# Patient Record
Sex: MALE | Race: WHITE | NOT HISPANIC OR LATINO | Employment: FULL TIME | URBAN - NONMETROPOLITAN AREA
[De-identification: names, ages, dates, MRNs, and addresses within clinical notes are randomized per-mention and may not be internally consistent; named-entity substitution may affect disease eponyms.]

---

## 2019-06-11 ENCOUNTER — OFFICE VISIT (OUTPATIENT)
Dept: FAMILY MEDICINE CLINIC | Facility: CLINIC | Age: 39
End: 2019-06-11
Payer: COMMERCIAL

## 2019-06-11 VITALS
SYSTOLIC BLOOD PRESSURE: 128 MMHG | WEIGHT: 180.3 LBS | DIASTOLIC BLOOD PRESSURE: 82 MMHG | HEIGHT: 70 IN | BODY MASS INDEX: 25.81 KG/M2 | HEART RATE: 64 BPM

## 2019-06-11 DIAGNOSIS — F41.1 GENERALIZED ANXIETY DISORDER: Primary | Chronic | ICD-10-CM

## 2019-06-11 PROCEDURE — 99213 OFFICE O/P EST LOW 20 MIN: CPT | Performed by: FAMILY MEDICINE

## 2019-06-12 PROBLEM — F41.1 GENERALIZED ANXIETY DISORDER: Chronic | Status: ACTIVE | Noted: 2019-06-12

## 2019-06-28 ENCOUNTER — TELEPHONE (OUTPATIENT)
Dept: FAMILY MEDICINE CLINIC | Facility: CLINIC | Age: 39
End: 2019-06-28
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
## 2019-06-28 NOTE — TELEPHONE ENCOUNTER
Pt was bit by a tick about 2 weeks ago and would like to know if you could do the titer blood test on him

## 2019-07-03 ENCOUNTER — APPOINTMENT (OUTPATIENT)
Dept: LAB | Facility: HOSPITAL | Age: 39
End: 2019-07-03
Payer: COMMERCIAL
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
## 2019-07-03 PROCEDURE — 36415 COLL VENOUS BLD VENIPUNCTURE: CPT

## 2019-07-03 PROCEDURE — 86618 LYME DISEASE ANTIBODY: CPT

## 2019-07-03 PROCEDURE — 86617 LYME DISEASE ANTIBODY: CPT

## 2019-07-05 ENCOUNTER — OFFICE VISIT (OUTPATIENT)
Dept: FAMILY MEDICINE CLINIC | Facility: CLINIC | Age: 39
End: 2019-07-05
Payer: COMMERCIAL

## 2019-07-05 VITALS
WEIGHT: 175.7 LBS | BODY MASS INDEX: 25.15 KG/M2 | DIASTOLIC BLOOD PRESSURE: 82 MMHG | HEIGHT: 70 IN | SYSTOLIC BLOOD PRESSURE: 122 MMHG

## 2019-07-05 DIAGNOSIS — A69.20 ERYTHEMA CHRONICUM MIGRANS: Primary | ICD-10-CM

## 2019-07-05 LAB
B BURGDOR IGG SER IA-ACNC: 0.16
B BURGDOR IGM SER IA-ACNC: 5.96

## 2019-07-05 PROCEDURE — 99212 OFFICE O/P EST SF 10 MIN: CPT | Performed by: FAMILY MEDICINE

## 2019-07-05 RX ORDER — DOXYCYCLINE HYCLATE 100 MG/1
100 CAPSULE ORAL EVERY 12 HOURS SCHEDULED
Qty: 42 CAPSULE | Refills: 0 | Status: SHIPPED | OUTPATIENT
Start: 2019-07-05 | End: 2019-07-26

## 2019-07-05 NOTE — ASSESSMENT & PLAN NOTE
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  Patient just had a Lyme disease test a few days ago because of a previous tick bite  I advised the patient that when you have erythema chronicum migrans with no other symptoms, blood test is only positive approximately 21% of the time  This is old that though  Current blood test is pending  Regardless, he has erythema chronicum migrans needs treatment  Because he has no flu-like symptoms, joint pain, or myalgias, he will be treated for 21 days rather than 28 days  He was started on doxycycline  He was advised of photosensitivity  He should use at least a 15  Sunscreen, preferably higher  He should take the medication on an empty stomach  He should not take the medication with milk

## 2019-07-05 NOTE — PROGRESS NOTES
Assessment/Plan:    Erythema chronicum migrans  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  Patient just had a Lyme disease test a few days ago because of a previous tick bite  I advised the patient that when you have erythema chronicum migrans with no other symptoms, blood test is only positive approximately 21% of the time  This is old that though  Current blood test is pending  Regardless, he has erythema chronicum migrans needs treatment  Because he has no flu-like symptoms, joint pain, or myalgias, he will be treated for 21 days rather than 28 days  He was started on doxycycline  He was advised of photosensitivity  He should use at least a 15  Sunscreen, preferably higher  He should take the medication on an empty stomach  He should not take the medication with milk  Diagnoses and all orders for this visit:    Erythema chronicum migrans  -     doxycycline hyclate (VIBRAMYCIN) 100 mg capsule; Take 1 capsule (100 mg total) by mouth every 12 (twelve) hours for 21 days          Subjective:      Patient ID: Jesus Manuel Rodríguez is a 46 y o  male  This patient is a 59-year-old white male presents to the office today complaining of a rash in the right axilla which is spray  He reports a red rash which is gradually getting bigger  It does not itch  It does not burn  Seems to be spreading up towards his clavicle now  It first started last night      The following portions of the patient's history were reviewed and updated as appropriate: allergies, current medications, past family history, past medical history, past social history, past surgical history and problem list     Review of Systems   Constitutional: Negative for chills, fatigue and fever  Musculoskeletal: Negative for arthralgias, joint swelling, myalgias and neck stiffness           Objective:      /82 (BP Location: Left arm, Patient Position: Sitting, Cuff Size: Adult)   Ht 5' 10" (1 778 m)   Wt 79 7 kg (175 lb 11 2 oz)   BMI 25 21 kg/m²          Physical Exam   Skin:   There is a red rash present the knees the patient's axilla  It is not hot to the touch  It is a bull's eye type lesion  There was no central clearing

## 2019-07-07 LAB
B BURGDOR IGG PATRN SER IB-IMP: NEGATIVE
B BURGDOR IGM PATRN SER IB-IMP: POSITIVE
B BURGDOR18KD IGG SER QL IB: PRESENT
B BURGDOR23KD IGG SER QL IB: ABNORMAL
B BURGDOR23KD IGM SER QL IB: PRESENT
B BURGDOR28KD IGG SER QL IB: ABNORMAL
B BURGDOR30KD IGG SER QL IB: ABNORMAL
B BURGDOR39KD IGG SER QL IB: ABNORMAL
B BURGDOR39KD IGM SER QL IB: PRESENT
B BURGDOR41KD IGG SER QL IB: ABNORMAL
B BURGDOR41KD IGM SER QL IB: PRESENT
B BURGDOR45KD IGG SER QL IB: ABNORMAL
B BURGDOR58KD IGG SER QL IB: ABNORMAL
B BURGDOR66KD IGG SER QL IB: ABNORMAL
B BURGDOR93KD IGG SER QL IB: ABNORMAL

## 2019-07-08 DIAGNOSIS — F41.1 GENERALIZED ANXIETY DISORDER: Primary | Chronic | ICD-10-CM

## 2019-07-08 RX ORDER — SERTRALINE HYDROCHLORIDE 100 MG/1
100 TABLET, FILM COATED ORAL
Qty: 30 TABLET | Refills: 5 | Status: SHIPPED | OUTPATIENT
Start: 2019-07-08 | End: 2019-10-14 | Stop reason: SDUPTHER

## 2019-07-25 ENCOUNTER — OFFICE VISIT (OUTPATIENT)
Dept: FAMILY MEDICINE CLINIC | Facility: CLINIC | Age: 39
End: 2019-07-25
Payer: COMMERCIAL

## 2019-07-25 VITALS
OXYGEN SATURATION: 97 % | HEART RATE: 63 BPM | HEIGHT: 70 IN | WEIGHT: 179.4 LBS | BODY MASS INDEX: 25.68 KG/M2 | SYSTOLIC BLOOD PRESSURE: 120 MMHG | DIASTOLIC BLOOD PRESSURE: 80 MMHG

## 2019-07-25 DIAGNOSIS — F41.1 GENERALIZED ANXIETY DISORDER: Chronic | ICD-10-CM

## 2019-07-25 DIAGNOSIS — A69.20 ERYTHEMA CHRONICUM MIGRANS: Primary | ICD-10-CM

## 2019-07-25 PROCEDURE — 3008F BODY MASS INDEX DOCD: CPT | Performed by: FAMILY MEDICINE

## 2019-07-25 PROCEDURE — 1036F TOBACCO NON-USER: CPT | Performed by: FAMILY MEDICINE

## 2019-07-25 PROCEDURE — 99213 OFFICE O/P EST LOW 20 MIN: CPT | Performed by: FAMILY MEDICINE

## 2019-07-25 NOTE — PROGRESS NOTES
Assessment/Plan:    Erythema chronicum migrans  Patient has Lyme disease with erythema chronicum migrans  He is going to finish out the last of the doxycycline  He will call me if he has any type of arthralgias  I advised him to check himself any time he is outside  When he comes inside, he should this role been check his body for any ticks  Generalized anxiety disorder  Symptoms are currently well controlled on sertraline  We will continue this medication  Return to the office in 6 months  Diagnoses and all orders for this visit:    Erythema chronicum migrans    Generalized anxiety disorder          Subjective:      Patient ID: Isabella Schmidt is a 46 y o  male  This patient is a 50-year-old white male presents to the office today for his follow-up visit regarding his Lyme disease as well as follow-up for his generalized anxiety disorder  The patient tells me with regards to his Lyme disease, he is doing well  He will be finishing his course of doxycycline tomorrow  He has tolerated the medication well  He has been using sunscreen and has been able to avoid any sun burns  He reports his rash went away the next day after he started the doxycycline  He did not develop any other rashes  He denies any arthralgias  He states he has a chronic sore neck  He has not had any other flu-like symptoms  Patient reports his anxiety seems to be well controlled  He denies any side effects from the sertraline  The following portions of the patient's history were reviewed and updated as appropriate: allergies, current medications, past family history, past medical history, past social history, past surgical history and problem list     Review of Systems   Cardiovascular: Negative for chest pain, palpitations and leg swelling  Musculoskeletal: Positive for neck pain and neck stiffness  Negative for arthralgias, back pain, gait problem, joint swelling and myalgias           Objective:      /80 Pulse 63   Ht 5' 10" (1 778 m)   Wt 81 4 kg (179 lb 6 4 oz)   SpO2 97%   BMI 25 74 kg/m²          Physical Exam   Constitutional: He appears well-developed and well-nourished  HENT:   Head: Normocephalic and atraumatic  Right Ear: External ear normal    Left Ear: External ear normal    Mouth/Throat: Oropharynx is clear and moist  No oropharyngeal exudate  Tympanic membranes are clear   Eyes: Pupils are equal, round, and reactive to light  Conjunctivae are normal  No scleral icterus  Neck: Neck supple  No tracheal deviation present  No thyromegaly present  Cardiovascular: Normal rate, regular rhythm and normal heart sounds  Exam reveals no gallop and no friction rub  No murmur heard  Pulmonary/Chest: Effort normal and breath sounds normal  No stridor  No respiratory distress  He has no wheezes  He has no rales  Abdominal: Soft  Bowel sounds are normal  He exhibits no distension and no mass  There is no tenderness  There is no rebound and no guarding  Lymphadenopathy:     He has no cervical adenopathy  Skin: Skin is warm and dry  No rash noted  Psychiatric: He has a normal mood and affect  His behavior is normal  Judgment and thought content normal    Vitals reviewed  extremities:  Without cyanosis, clubbing, or edema

## 2019-07-25 NOTE — ASSESSMENT & PLAN NOTE
Patient has Lyme disease with erythema chronicum migrans  He is going to finish out the last of the doxycycline  He will call me if he has any type of arthralgias  I advised him to check himself any time he is outside  When he comes inside, he should this role been check his body for any ticks

## 2019-07-25 NOTE — PATIENT INSTRUCTIONS
Tick Bite   WHAT YOU NEED TO KNOW:   Most tick bites are not dangerous, but ticks can pass disease or infection when they bite  Ticks need to be removed quickly  You may have redness, pain, itching, and swelling near the bite  Blisters may also develop  DISCHARGE INSTRUCTIONS:   Return to the emergency department if:   · You have trouble walking or moving your legs  · You have joint pain, muscle pain, or muscle weakness within 1 month of a tick bite  · You have a fever, chills, headache, or rash  Contact your healthcare provider if:   · You cannot remove the tick  · The tick's head is stuck in your skin  · You have questions or concerns about your condition or care  Medicines:   · Medicines  help decrease pain, redness, itching, and swelling  You may also need medicine to prevent or fight a bacterial infection  These medicines may be given as a cream, lotion, or pill  · Take your medicine as directed  Contact your healthcare provider if you think your medicine is not helping or if you have side effects  Tell him of her if you are allergic to any medicine  Keep a list of the medicines, vitamins, and herbs you take  Include the amounts, and when and why you take them  Bring the list or the pill bottles to follow-up visits  Carry your medicine list with you in case of an emergency  How to remove a tick:  Remove the tick as soon as possible to help prevent disease or infection  You are less likely to get sick from a tick bite if you remove the tick within 24 hours  Do not use petroleum jelly, nail polish, rubbing alcohol, or heat  These do not work and may be dangerous  Do the following to remove a tick:  · First, try a soapy cotton ball  Soak a cotton ball in liquid soap  Cover the tick with the cotton ball for 30 seconds  The tick may come off with the cotton ball when you pull it away  · Use tweezers if the soapy cotton ball does not work  Grasp the tick as close to your skin as possible  Pull the tick straight up and out  Do not touch the tick with your bare hands  · Do not twist or jerk the tick suddenly, because this may break off the tick's head or mouth parts  Do not leave any part of the tick in your skin  · Do not crush or squeeze the tick since its body may be infected with germs  Flush the tick down the toilet  · After the tick is removed, clean the area of the bite with rubbing alcohol  Then wash your hands with soap and water  Apply ice  on your bite for 15 to 20 minutes every hour or as directed  Use an ice pack, or put crushed ice in a plastic bag  Cover it with a towel before you apply it to your skin  Ice helps prevent tissue damage and decreases swelling and pain  Prevent a tick bite:  Ticks live in areas covered by brush and grass  They may even be found in your lawn if you live in certain areas  Outdoor pets can carry ticks inside the house  Ticks can grab onto you or your clothes when you walk by grass or brush  If you go into areas that contain many trees, tall grasses, and underbrush, do the following:  · Wear light colored pants and a long-sleeved shirt  Tuck your pants into your socks or boots  Tuck in your shirt  Wear sleeves that fit close to the skin at your wrists and neck  This will help prevent ticks from crawling through gaps in your clothing and onto your skin  Wear a hat in areas with trees  · Apply insect repellant on your skin  The insect repellant should contain DEET  Do not put insect repellant on skin that is cut, scratched, or irritated  Always use soap and water to wash the insect repellant off as soon as possible once you are indoors  Do not apply insect repellant on your child's face or hands  · Spray insect repellant onto your clothes  Use permethrin spray  This spray kills ticks that crawl on your clothing  Be sure to spray the tops of your boots, bottom of pant legs, and sleeve cuffs   As soon as possible, wash and dry clothing in hot water and high heat  · Check your clothing, hair, and skin for ticks  Shower within 2 hours of coming indoors  Carefully check the hairline, armpits, neck, and waist  Check your pets and children for ticks  Remove ticks from pets the same way as you remove them from people  · Decrease the risk for ticks in your yard  Ticks like to live in shady, moist areas  Sylvia Ramirez your lawn regularly to keep the grass short  Trim the grass around birdbaths and fences  Cut branches that are overgrown and take them out of the yard  Clear out leaf piles  Caralyn Salter firewood in a dry, matt area  Follow up with your healthcare provider as directed:  Write down your questions so you remember to ask them during your visits  © 2017 [employer] Information is for End User's use only and may not be sold, redistributed or otherwise used for commercial purposes  All illustrations and images included in CareNotes® are the copyrighted property of [employer]  or [employer]  The above information is an  only  It is not intended as medical advice for individual conditions or treatments  Talk to your doctor, nurse or pharmacist before following any medical regimen to see if it is safe and effective for you

## 2019-07-25 NOTE — ASSESSMENT & PLAN NOTE
Symptoms are currently well controlled on sertraline  We will continue this medication  Return to the office in 6 months

## 2019-10-14 DIAGNOSIS — F41.1 GENERALIZED ANXIETY DISORDER: Chronic | ICD-10-CM

## 2019-10-14 RX ORDER — SERTRALINE HYDROCHLORIDE 100 MG/1
100 TABLET, FILM COATED ORAL
Qty: 90 TABLET | Refills: 1 | Status: SHIPPED | OUTPATIENT
Start: 2019-10-14 | End: 2020-07-28 | Stop reason: DRUGHIGH

## 2020-01-27 ENCOUNTER — OFFICE VISIT (OUTPATIENT)
Dept: FAMILY MEDICINE CLINIC | Facility: CLINIC | Age: 40
End: 2020-01-27
Payer: COMMERCIAL

## 2020-01-27 VITALS
WEIGHT: 175.9 LBS | HEART RATE: 61 BPM | HEIGHT: 70 IN | DIASTOLIC BLOOD PRESSURE: 80 MMHG | SYSTOLIC BLOOD PRESSURE: 118 MMHG | BODY MASS INDEX: 25.18 KG/M2 | OXYGEN SATURATION: 96 %

## 2020-01-27 DIAGNOSIS — R53.83 OTHER FATIGUE: Primary | ICD-10-CM

## 2020-01-27 DIAGNOSIS — F41.1 GENERALIZED ANXIETY DISORDER: Chronic | ICD-10-CM

## 2020-01-27 PROCEDURE — 1036F TOBACCO NON-USER: CPT | Performed by: FAMILY MEDICINE

## 2020-01-27 PROCEDURE — 3008F BODY MASS INDEX DOCD: CPT | Performed by: FAMILY MEDICINE

## 2020-01-27 PROCEDURE — 99213 OFFICE O/P EST LOW 20 MIN: CPT | Performed by: FAMILY MEDICINE

## 2020-01-27 NOTE — PATIENT INSTRUCTIONS
Generalized Anxiety Disorder   AMBULATORY CARE:   Generalized anxiety disorder (NICO)  is a condition that causes you to worry more than normal  It also causes you to feel fear in most situations  You are worried or afraid even without a cause  You may worry about your health, job, money, and relationships  It is hard for you to control your worry and feel calm  NICO prevents you from doing daily activities  It may also prevent you from spending time with family and friends  Without treatment, your anxiety may get worse  Common signs and symptoms that may occur with anxiety:   · Fatigue or muscle tightness     · Shaking, restlessness, or irritability     · Problems focusing     · Trouble sleeping     · Feeling jumpy, easily startled, or dizzy     · Nausea, vomiting, diarrhea, or sweating    · Rapid heartbeat or shortness of breath  Call 911 for any of the following:   · You have chest pain, tightness, or heaviness that may spread to your shoulders, arms, jaw, neck, or back  · You feel like hurting yourself or someone else  Contact your healthcare provider if:   · Your symptoms get worse or do not get better with treatment  · Your anxiety keeps you from doing your regular daily activities  · You have new symptoms since your last visit  · You have questions or concerns about your condition or care  Treatment for NICO  may include medicines to help you feel calm and relaxed, and decrease your symptoms  Medicines are usually given together with therapy or other treatments  Manage anxiety:   · Talk to someone about your anxiety  Your healthcare provider may suggest counseling  Cognitive behavioral therapy can help you understand and change how you react to events  It can also help you understand what triggers your symptoms  You might feel more comfortable talking with a friend or family member about your anxiety  Choose someone you know will be supportive and encouraging      · Keep a journal of your symptoms  Write down what you were doing before your symptoms started  Also write down what made the anxiety better or worse  Bring this journal with you to your follow-up appointments  · Find ways to relax  Activities such as yoga, meditation, or listening to music can help you relax  Spend time with friends, or do things you enjoy  · Practice deep breathing  Deep breathing can help you relax when you feel anxious  Focus on taking slow, deep breaths several times a day, or during an anxiety attack  Slowly breathe in through your nose  Pause, then slowly breathe out through your mouth  Try to breathe out longer than you breathed in  · Create a regular sleep routine  Regular sleep can help you feel calmer during the day  Go to sleep and wake up at the same times every day  Do not watch television or use the computer right before bed  Your room should be comfortable, dark, and quiet  · Eat a variety of healthy foods  Healthy foods include fruits, vegetables, low-fat dairy products, lean meats, fish, whole-grain breads, and cooked beans  Healthy foods can help you feel less anxious and have more energy  · Exercise regularly  Exercise can increase your energy level  Exercise may also lift your mood and help you sleep better  Your healthcare provider can help you create an exercise plan  · Do not smoke  Nicotine and other chemicals in cigarettes and cigars can increase anxiety  Ask your healthcare provider for information if you currently smoke and need help to quit  E-cigarettes or smokeless tobacco still contain nicotine  Talk to your healthcare provider before you use these products  · Do not have caffeine  Caffeine can make your symptoms worse  Do not have foods or drinks that are meant to increase your energy level  · Limit or do not drink alcohol  Ask your healthcare provider if alcohol is safe for you  Also ask how much is safe   You may not be able to drink alcohol if you take certain anxiety or depression medicines  · Do not use drugs  Drugs can make your anxiety worse  It can also make anxiety hard to manage  Talk to your healthcare provider if you use drugs and want help to quit  Follow up with your healthcare provider as directed:  Write down your questions so you remember to ask them during your visits  © 2017 [employer] Information is for End User's use only and may not be sold, redistributed or otherwise used for commercial purposes  All illustrations and images included in CareNotes® are the copyrighted property of [employer]  or [employer]  The above information is an  only  It is not intended as medical advice for individual conditions or treatments  Talk to your doctor, nurse or pharmacist before following any medical regimen to see if it is safe and effective for you

## 2020-01-27 NOTE — PROGRESS NOTES
Assessment/Plan:    Other fatigue  Routine labs have been ordered    Generalized anxiety disorder  Patient will continue sertraline 100 mg at bedtime  Diagnoses and all orders for this visit:    Other fatigue  -     CBC and differential; Future  -     Comprehensive metabolic panel; Future  -     Lipid panel; Future  -     TSH, 3rd generation with Free T4 reflex; Future    Generalized anxiety disorder        BMI Counseling: Body mass index is 25 24 kg/m²  The BMI is above normal  Nutrition recommendations include decreasing portion sizes, encouraging healthy choices of fruits and vegetables, decreasing fast food intake, consuming healthier snacks, limiting drinks that contain sugar and moderation in carbohydrate intake  Exercise recommendations include exercising 3-5 times per week and obtaining a gym membership  No pharmacotherapy was ordered  Patient reports he is currently exercising regularly at a gym        Subjective:      Patient ID: Gaurav Simon is a 46 y o  male  This patient is a 26-year-old white male who presents to the office today for his follow-up visit  The patient is doing well  He is tolerating the sertraline without any side effects  He found it has been very effective for him  He finds that his anxiety is well controlled  He states he is no longer excessively worrying indwelling on things  He denies any depression  He states that his wife believes it is working well for him also  Overall, he has been doing very well  The following portions of the patient's history were reviewed and updated as appropriate: allergies, current medications, past family history, past medical history, past social history, past surgical history and problem list     Review of Systems   Constitutional: Positive for fatigue  Cardiovascular: Negative for chest pain, palpitations and leg swelling     Gastrointestinal: Negative for abdominal distention, blood in stool, constipation, diarrhea, nausea and vomiting  Objective:      /80 (BP Location: Left arm, Patient Position: Sitting, Cuff Size: Adult)   Pulse 61   Ht 5' 10" (1 778 m)   Wt 79 8 kg (175 lb 14 4 oz)   SpO2 96%   BMI 25 24 kg/m²          Physical Exam   Constitutional: He appears well-developed and well-nourished  No distress  HENT:   Head: Normocephalic and atraumatic  Right Ear: External ear normal    Left Ear: External ear normal    Mouth/Throat: Oropharynx is clear and moist  No oropharyngeal exudate  Tympanic membranes are clear   Eyes: Pupils are equal, round, and reactive to light  Conjunctivae are normal  No scleral icterus  Neck: Neck supple  No tracheal deviation present  No thyromegaly present  Cardiovascular: Normal rate, regular rhythm and normal heart sounds  Exam reveals no gallop and no friction rub  No murmur heard  Pulmonary/Chest: Effort normal and breath sounds normal  No stridor  No respiratory distress  He has no wheezes  He has no rales  Abdominal: Soft  Bowel sounds are normal  He exhibits no distension and no mass  There is no tenderness  There is no guarding  There is no organomegaly noted   Lymphadenopathy:     He has no cervical adenopathy  Psychiatric: He has a normal mood and affect  His behavior is normal  Judgment and thought content normal    Vitals reviewed      extremities:  Without cyanosis, clubbing, or edema

## 2020-07-28 ENCOUNTER — OFFICE VISIT (OUTPATIENT)
Dept: FAMILY MEDICINE CLINIC | Facility: CLINIC | Age: 40
End: 2020-07-28
Payer: COMMERCIAL

## 2020-07-28 VITALS
OXYGEN SATURATION: 96 % | WEIGHT: 172.1 LBS | BODY MASS INDEX: 24.64 KG/M2 | SYSTOLIC BLOOD PRESSURE: 130 MMHG | DIASTOLIC BLOOD PRESSURE: 74 MMHG | HEART RATE: 79 BPM | HEIGHT: 70 IN | TEMPERATURE: 97.6 F

## 2020-07-28 DIAGNOSIS — F41.1 GENERALIZED ANXIETY DISORDER: Primary | Chronic | ICD-10-CM

## 2020-07-28 PROCEDURE — 4004F PT TOBACCO SCREEN RCVD TLK: CPT | Performed by: FAMILY MEDICINE

## 2020-07-28 PROCEDURE — 99213 OFFICE O/P EST LOW 20 MIN: CPT | Performed by: FAMILY MEDICINE

## 2020-07-28 PROCEDURE — 3008F BODY MASS INDEX DOCD: CPT | Performed by: FAMILY MEDICINE

## 2020-07-28 NOTE — PATIENT INSTRUCTIONS
Anxiety   WHAT YOU NEED TO KNOW:   What do I need to know about anxiety? Anxiety is a condition that causes you to feel extremely worried or nervous  The feelings are so strong that they can cause problems with your daily activities or sleep  Anxiety may be triggered by something you fear, or it may happen without a cause  Family or work stress, smoking, caffeine, and alcohol can increase your risk for anxiety  Certain medicines or health conditions can also increase your risk  Anxiety can become a long-term condition if it is not managed or treated  What other common signs and symptoms may occur with anxiety? · Fatigue or muscle tightness     · Shaking, restlessness, or irritability     · Problems focusing     · Trouble sleeping     · Feeling jumpy, easily startled, or dizzy     · Rapid heartbeat or shortness of breath  What do I need to tell my healthcare provider about my anxiety? Tell your healthcare provider when your symptoms began and what triggers them  Tell your provider if anxiety affects your daily activities  Your provider will also ask about your medical history and if you have family members with a similar condition  Tell your provider about your past and present alcohol, nicotine, or drug use  What can I do to manage anxiety? You may get medicines to help you feel calm and relaxed, and to decrease your symptoms  Medicines are usually given together with therapy or other treatments  The following can help you manage anxiety:  · Talk to someone about your anxiety  Your healthcare provider may suggest counseling  Cognitive behavioral therapy can help you understand and change how you react to events that trigger your symptoms  You might feel more comfortable talking with a friend or family member about your anxiety  Choose someone you know will be supportive and encouraging  · Find ways to relax  Activities such as exercise, meditation, or listening to music can help you relax   Spend time with friends, or do things you enjoy  · Practice deep breathing  Deep breathing can help you relax when you feel anxious  Focus on taking slow, deep breaths several times a day, or during an anxiety attack  Breathe in through your nose and out through your mouth  · Create a regular sleep routine  Regular sleep can help you feel calmer during the day  Go to sleep and wake up at the same times every day  Do not watch television or use the computer right before bed  Your room should be comfortable, dark, and quiet  · Eat a variety of healthy foods  Healthy foods include fruits, vegetables, low-fat dairy products, lean meats, fish, whole-grain breads, and cooked beans  Healthy foods can help you feel less anxious and have more energy  · Exercise regularly  Exercise can increase your energy level  Exercise may also lift your mood and help you sleep better  Your healthcare provider can help you create an exercise plan  · Do not smoke  Nicotine and other chemicals in cigarettes and cigars can increase anxiety  Ask your healthcare provider for information if you currently smoke and need help to quit  E-cigarettes or smokeless tobacco still contain nicotine  Talk to your healthcare provider before you use these products  · Do not have caffeine  Caffeine can make your symptoms worse  Do not have foods or drinks that are meant to increase your energy level  · Limit or do not drink alcohol  Ask your healthcare provider if alcohol is safe for you  You may not be able to drink alcohol if you take certain anxiety or depression medicines  Limit alcohol to 1 drink per day if you are a woman  Limit alcohol to 2 drinks per day if you are a man  A drink of alcohol is 12 ounces of beer, 5 ounces of wine, or 1½ ounces of liquor  · Do not use drugs  Drugs can make your anxiety worse  It can also make anxiety hard to manage  Talk to your healthcare provider if you use drugs and want help to quit    Call 911 if:   · You have chest pain, tightness, or heaviness that may spread to your shoulders, arms, jaw, neck, or back  · You feel like hurting yourself or someone else  When should I contact my healthcare provider? · Your symptoms get worse or do not get better with treatment  · Your anxiety keeps you from doing your regular daily activities  · You have new symptoms since your last visit  · You have questions or concerns about your condition or care  CARE AGREEMENT:   You have the right to help plan your care  Learn about your health condition and how it may be treated  Discuss treatment options with your caregivers to decide what care you want to receive  You always have the right to refuse treatment  The above information is an  only  It is not intended as medical advice for individual conditions or treatments  Talk to your doctor, nurse or pharmacist before following any medical regimen to see if it is safe and effective for you  © 2017 [address] Information is for End User's use only and may not be sold, redistributed or otherwise used for commercial purposes  All illustrations and images included in CareNotes® are the copyrighted property of [employer] M [employer]  or [employer]

## 2020-07-28 NOTE — PROGRESS NOTES
Assessment/Plan:    Generalized anxiety disorder  Patient has been on sertraline now for over a year  I told him that he may require treatment lifelong  He wants to try to stop the medication  I am agreeable to trying  I told him his body will tell him if he still needs it  I gave him a prescription for sertraline 50 mg tablets  He will take 1/2 tablet daily for 10 days  He will then take 1/2 tablet every other day for 10 days  He will then stop it  He plans to try CBD oil for anxiety once off sertraline  I did schedule the patient another visit for 6 months  However, if he has recurrence of his symptoms, he should contact me and I will get him in immediately  Diagnoses and all orders for this visit:    Generalized anxiety disorder  -     sertraline (ZOLOFT) 50 mg tablet; Take 1/2 tablet daily x 10 days, then 1/2 tab every other day x 10 days, then stop          Subjective:      Patient ID: Jacinta Huggins is a 45 y o  male  This is a 71-year-old white male presents to the office today for his routine checkup  The patient is doing well and has no complaints  He began cutting his sertraline and half months ago  He had some side effects from the higher dose  He his symptoms have been well controlled on 50 mg of sertraline daily  He is running regularly for exercise in doing [employer] work  He is watching his diet  He wonders if he can wean off sertraline  The following portions of the patient's history were reviewed and updated as appropriate: allergies, current medications, past family history, past medical history, past social history, past surgical history and problem list     Review of Systems   Respiratory: Negative for cough, shortness of breath and wheezing  Cardiovascular: Negative for chest pain, palpitations and leg swelling  Psychiatric/Behavioral: Negative for dysphoric mood, sleep disturbance and suicidal ideas  The patient is not nervous/anxious            Objective:      BP 130/74 (BP Location: Left arm, Patient Position: Sitting, Cuff Size: Adult)   Pulse 79   Temp 97 6 °F (36 4 °C) (Temporal)   Ht 5' 10" (1 778 m)   Wt 78 1 kg (172 lb 1 6 oz)   SpO2 96%   BMI 24 69 kg/m²          Physical Exam   Constitutional:   This is a pleasant 71-year-old white male who appears his stated age  He is pleasant, cooperative, and in no distress   HENT:   Head: Normocephalic and atraumatic  Right Ear: External ear normal    Left Ear: External ear normal    Mouth/Throat: Oropharynx is clear and moist  No oropharyngeal exudate  Tympanic membranes are clear   Eyes: Pupils are equal, round, and reactive to light  Conjunctivae are normal  Right eye exhibits no discharge  Left eye exhibits no discharge  No scleral icterus  Neck: Neck supple  No tracheal deviation present  No thyromegaly present  Cardiovascular: Normal rate, regular rhythm and normal heart sounds  Exam reveals no gallop and no friction rub  No murmur heard  Pulmonary/Chest: Effort normal and breath sounds normal  No stridor  No respiratory distress  He has no wheezes  He has no rales  Abdominal: Soft  Bowel sounds are normal  He exhibits no distension and no mass  There is no tenderness  There is no guarding  No organomegaly is noted   Lymphadenopathy:     He has no cervical adenopathy  Psychiatric: He has a normal mood and affect  His behavior is normal  Judgment and thought content normal    Vitals reviewed

## 2020-07-28 NOTE — ASSESSMENT & PLAN NOTE
Patient has been on sertraline now for over a year  I told him that he may require treatment lifelong  He wants to try to stop the medication  I am agreeable to trying  I told him his body will tell him if he still needs it  I gave him a prescription for sertraline 50 mg tablets  He will take 1/2 tablet daily for 10 days  He will then take 1/2 tablet every other day for 10 days  He will then stop it  He plans to try CBD oil for anxiety once off sertraline  I did schedule the patient another visit for 6 months  However, if he has recurrence of his symptoms, he should contact me and I will get him in immediately

## 2020-10-13 ENCOUNTER — TELEMEDICINE (OUTPATIENT)
Dept: FAMILY MEDICINE CLINIC | Facility: CLINIC | Age: 40
End: 2020-10-13
Payer: COMMERCIAL

## 2020-10-13 VITALS
WEIGHT: 172 LBS | TEMPERATURE: 96.8 F | BODY MASS INDEX: 24.68 KG/M2 | SYSTOLIC BLOOD PRESSURE: 117 MMHG | DIASTOLIC BLOOD PRESSURE: 68 MMHG

## 2020-10-13 DIAGNOSIS — J02.9 ACUTE PHARYNGITIS, UNSPECIFIED ETIOLOGY: ICD-10-CM

## 2020-10-13 DIAGNOSIS — Z20.828 EXPOSURE TO SARS-ASSOCIATED CORONAVIRUS: Primary | ICD-10-CM

## 2020-10-13 PROCEDURE — 3725F SCREEN DEPRESSION PERFORMED: CPT | Performed by: FAMILY MEDICINE

## 2020-10-13 PROCEDURE — U0003 INFECTIOUS AGENT DETECTION BY NUCLEIC ACID (DNA OR RNA); SEVERE ACUTE RESPIRATORY SYNDROME CORONAVIRUS 2 (SARS-COV-2) (CORONAVIRUS DISEASE [COVID-19]), AMPLIFIED PROBE TECHNIQUE, MAKING USE OF HIGH THROUGHPUT TECHNOLOGIES AS DESCRIBED BY CMS-2020-01-R: HCPCS | Performed by: FAMILY MEDICINE

## 2020-10-13 PROCEDURE — 87070 CULTURE OTHR SPECIMN AEROBIC: CPT | Performed by: FAMILY MEDICINE

## 2020-10-13 PROCEDURE — 1036F TOBACCO NON-USER: CPT | Performed by: FAMILY MEDICINE

## 2020-10-13 PROCEDURE — 99213 OFFICE O/P EST LOW 20 MIN: CPT | Performed by: FAMILY MEDICINE

## 2020-10-14 LAB — SARS-COV-2 RNA SPEC QL NAA+PROBE: NOT DETECTED

## 2020-10-16 LAB — BACTERIA THROAT CULT: NORMAL

## 2021-01-28 ENCOUNTER — OFFICE VISIT (OUTPATIENT)
Dept: FAMILY MEDICINE CLINIC | Facility: CLINIC | Age: 41
End: 2021-01-28
Payer: COMMERCIAL

## 2021-01-28 VITALS
HEART RATE: 68 BPM | TEMPERATURE: 97.9 F | HEIGHT: 70 IN | WEIGHT: 177.2 LBS | DIASTOLIC BLOOD PRESSURE: 68 MMHG | BODY MASS INDEX: 25.37 KG/M2 | OXYGEN SATURATION: 97 % | SYSTOLIC BLOOD PRESSURE: 122 MMHG

## 2021-01-28 DIAGNOSIS — F41.1 GENERALIZED ANXIETY DISORDER: Primary | Chronic | ICD-10-CM

## 2021-01-28 PROCEDURE — 99213 OFFICE O/P EST LOW 20 MIN: CPT | Performed by: FAMILY MEDICINE

## 2021-01-28 PROCEDURE — 3008F BODY MASS INDEX DOCD: CPT | Performed by: FAMILY MEDICINE

## 2021-01-28 PROCEDURE — 1036F TOBACCO NON-USER: CPT | Performed by: FAMILY MEDICINE

## 2021-01-28 RX ORDER — ALPRAZOLAM 0.5 MG/1
0.5 TABLET ORAL 3 TIMES DAILY PRN
Qty: 30 TABLET | Refills: 3 | Status: SHIPPED | OUTPATIENT
Start: 2021-01-28 | End: 2021-12-27 | Stop reason: ALTCHOICE

## 2021-01-28 NOTE — PATIENT INSTRUCTIONS
Anxiety   WHAT YOU NEED TO KNOW:   What do I need to know about anxiety? Anxiety is a condition that causes you to feel extremely worried or nervous  The feelings are so strong that they can cause problems with your daily activities or sleep  Anxiety may be triggered by something you fear, or it may happen without a cause  Family or work stress, smoking, caffeine, and alcohol can increase your risk for anxiety  Certain medicines or health conditions can also increase your risk  Anxiety can become a long-term condition if it is not managed or treated  What other common signs and symptoms may occur with anxiety? · Fatigue or muscle tightness    · Shaking, restlessness, or irritability    · Problems focusing    · Trouble sleeping    · Feeling jumpy, easily startled, or dizzy    · Rapid heartbeat or shortness of breath    What do I need to tell my healthcare provider about my anxiety? Tell your healthcare provider when your symptoms began and what triggers them  Tell your provider if anxiety affects your daily activities  Your provider will also ask about your medical history and if you have family members with a similar condition  Tell your provider about your past and present alcohol, nicotine, or drug use  What can I do to manage anxiety? You may get medicines to help you feel calm and relaxed, and to decrease your symptoms  Medicines are usually given together with therapy or other treatments  The following can help you manage anxiety:  · Talk to someone about your anxiety  Your healthcare provider may suggest counseling  Cognitive behavioral therapy can help you understand and change how you react to events that trigger your symptoms  You might feel more comfortable talking with a friend or family member about your anxiety  Choose someone you know will be supportive and encouraging  · Find ways to relax  Activities such as exercise, meditation, or listening to music can help you relax   Spend time with friends, or do things you enjoy  · Practice deep breathing  Deep breathing can help you relax when you feel anxious  Focus on taking slow, deep breaths several times a day, or during an anxiety attack  Breathe in through your nose and out through your mouth  · Create a regular sleep routine  Regular sleep can help you feel calmer during the day  Go to sleep and wake up at the same times every day  Do not watch television or use the computer right before bed  Your room should be comfortable, dark, and quiet  · Eat a variety of healthy foods  Healthy foods include fruits, vegetables, low-fat dairy products, lean meats, fish, whole-grain breads, and cooked beans  Healthy foods can help you feel less anxious and have more energy  · Exercise regularly  Exercise can increase your energy level  Exercise may also lift your mood and help you sleep better  Your healthcare provider can help you create an exercise plan  · Do not smoke  Nicotine and other chemicals in cigarettes and cigars can increase anxiety  Ask your healthcare provider for information if you currently smoke and need help to quit  E-cigarettes or smokeless tobacco still contain nicotine  Talk to your healthcare provider before you use these products  · Do not have caffeine  Caffeine can make your symptoms worse  Do not have foods or drinks that are meant to increase your energy level  · Limit or do not drink alcohol  Ask your healthcare provider if alcohol is safe for you  You may not be able to drink alcohol if you take certain anxiety or depression medicines  Limit alcohol to 1 drink per day if you are a woman  Limit alcohol to 2 drinks per day if you are a man  A drink of alcohol is 12 ounces of beer, 5 ounces of wine, or 1½ ounces of liquor  · Do not use drugs  Drugs can make your anxiety worse  It can also make anxiety hard to manage   Talk to your healthcare provider if you use drugs and want help to quit     Call your local emergency number (911 in the [address]) if:   · You have chest pain, tightness, or heaviness that may spread to your shoulders, arms, jaw, neck, or back  · You feel like hurting yourself or someone else  When should I call my doctor? · Your symptoms get worse or do not get better with treatment  · Your anxiety keeps you from doing your regular daily activities  · You have new symptoms since your last visit  · You have questions or concerns about your condition or care  CARE AGREEMENT:   You have the right to help plan your care  Learn about your health condition and how it may be treated  Discuss treatment options with your healthcare providers to decide what care you want to receive  You always have the right to refuse treatment  The above information is an  only  It is not intended as medical advice for individual conditions or treatments  Talk to your doctor, nurse or pharmacist before following any medical regimen to see if it is safe and effective for you  © Copyright [address] Information is for End User's use only and may not be sold, redistributed or otherwise used for commercial purposes   All illustrations and images included in CareNotes® are the copyrighted property of [employer]  or [address] [place] [address]

## 2021-01-28 NOTE — PROGRESS NOTES
Assessment/Plan:    Generalized anxiety disorder   Patient inquired about starting BuSpar  I told him BuSpar is taking 2-3 times per day  He is only having 2 or 3 episodes of anxiety a month  I do not think I would give him a medication to take regularly for infrequent symptoms  I suggested we try alprazolam as needed  I did tell him it is a controlled substance  He should not share with others or keep it were can be still in  He should not combine with alcohol or drive after taking it  He will take it just as needed  I queried the [place] prescription drug monitoring program   There were no red flags and it was safe to proceed  I sent a prescription to his pharmacy  Patient has been advised that his symptoms become more frequent any is taking this medication a couple of times per week, then he should contact me and I would consider starting him on BuSpar       Diagnoses and all orders for this visit:    Generalized anxiety disorder  -     ALPRAZolam (XANAX) 0 5 mg tablet; Take 1 tablet (0 5 mg total) by mouth 3 (three) times a day as needed for anxiety          Subjective:      Patient ID: Angelic Lozano is a 45 y o  male  This is a 60-year-old white male who presents to the office today for his checkup  The patient did wean himself off Zoloft  He tells me his anxiety has been stable  He still having symptoms but not like he did before  He tells me he experiences anxiety 2-3 times per month  He tells me he does not experience panic attacks  He describes his symptoms like a "constant tension"  He tells me in the past, he was experiencing symptoms every day, now, it is only 2-3 times per month        The following portions of the patient's history were reviewed and updated as appropriate: allergies, current medications, past family history, past medical history, past social history, past surgical history and problem list     Review of Systems   Respiratory: Negative for shortness of breath  Cardiovascular: Negative for chest pain, palpitations and leg swelling  Skin: Negative for rash (  Recent episode of impetigo)  Objective:      /68 (BP Location: Left arm, Patient Position: Sitting, Cuff Size: Large)   Pulse 68   Temp 97 9 °F (36 6 °C) (Temporal)   Ht 5' 10" (1 778 m)   Wt 80 4 kg (177 lb 3 2 oz)   SpO2 97%   BMI 25 43 kg/m²          Physical Exam  Vitals signs reviewed  Constitutional:       Comments: This is a 73-year-old white male appears his stated age  He is pleasant, well-nourished, and in no distress  HENT:      Head: Normocephalic and atraumatic  Right Ear: Tympanic membrane, ear canal and external ear normal  There is no impacted cerumen  Left Ear: Tympanic membrane, ear canal and external ear normal  There is no impacted cerumen  Mouth/Throat:      Mouth: Mucous membranes are moist       Pharynx: Oropharynx is clear  No oropharyngeal exudate or posterior oropharyngeal erythema  Eyes:      General: No scleral icterus  Right eye: No discharge  Left eye: No discharge  Conjunctiva/sclera: Conjunctivae normal       Pupils: Pupils are equal, round, and reactive to light  Neck:      Musculoskeletal: Neck supple  Cardiovascular:      Rate and Rhythm: Normal rate and regular rhythm  Heart sounds: Normal heart sounds  No murmur  No friction rub  No gallop  Pulmonary:      Effort: Pulmonary effort is normal  No respiratory distress  Breath sounds: Normal breath sounds  No stridor  No wheezing, rhonchi or rales  Abdominal:      General: Bowel sounds are normal  There is no distension  Palpations: Abdomen is soft  There is no mass  Tenderness: There is no abdominal tenderness  There is no guarding  Hernia: No hernia is present  Lymphadenopathy:      Cervical: No cervical adenopathy     Psychiatric:         Mood and Affect: Mood normal          Behavior: Behavior normal          Thought Content:  Thought content normal          Judgment: Judgment normal         Extremities: Without cyanosis, clubbing, or edema

## 2021-01-28 NOTE — ASSESSMENT & PLAN NOTE
Patient inquired about starting BuSpar  I told him BuSpar is taking 2-3 times per day  He is only having 2 or 3 episodes of anxiety a month  I do not think I would give him a medication to take regularly for infrequent symptoms  I suggested we try alprazolam as needed  I did tell him it is a controlled substance  He should not share with others or keep it were can be still in  He should not combine with alcohol or drive after taking it  He will take it just as needed  I queried the [facility] prescription drug monitoring program   There were no red flags and it was safe to proceed  I sent a prescription to his pharmacy    Patient has been advised that his symptoms become more frequent any is taking this medication a couple of times per week, then he should contact me and I would consider starting him on BuSpar

## 2021-06-24 ENCOUNTER — OFFICE VISIT (OUTPATIENT)
Dept: FAMILY MEDICINE CLINIC | Facility: CLINIC | Age: 41
End: 2021-06-24
Payer: COMMERCIAL

## 2021-06-24 VITALS
WEIGHT: 171.8 LBS | OXYGEN SATURATION: 96 % | SYSTOLIC BLOOD PRESSURE: 124 MMHG | HEIGHT: 70 IN | DIASTOLIC BLOOD PRESSURE: 72 MMHG | HEART RATE: 72 BPM | TEMPERATURE: 98 F | BODY MASS INDEX: 24.6 KG/M2

## 2021-06-24 DIAGNOSIS — F41.1 GENERALIZED ANXIETY DISORDER: Chronic | ICD-10-CM

## 2021-06-24 PROCEDURE — 3008F BODY MASS INDEX DOCD: CPT | Performed by: FAMILY MEDICINE

## 2021-06-24 PROCEDURE — 1036F TOBACCO NON-USER: CPT | Performed by: FAMILY MEDICINE

## 2021-06-24 PROCEDURE — 99213 OFFICE O/P EST LOW 20 MIN: CPT | Performed by: FAMILY MEDICINE

## 2021-06-24 NOTE — PROGRESS NOTES
Assessment/Plan:    Generalized anxiety disorder   The patient was started back on sertraline  He will take sertraline 50 mg, 1/2 tablet daily for 7 days  He will then increase the dose to 1 tablet daily  He may continue to take alprazolam as needed  I reminded him that this medication will take a while to start to work  I anticipate at least 4 weeks  I am going to schedule him a follow-up visit for 6 months  However, I advised the patient he should call me immediately if he has no improvement or worsening of his symptoms  Diagnoses and all orders for this visit:    Generalized anxiety disorder  -     sertraline (ZOLOFT) 50 mg tablet; Take 1/2 tablet daily x 7 days, then 1 tablet daily          Subjective:      Patient ID: Tj Jimenes is a 45 y o  male  This is a 28-year-old white male who presents to the office today for his routine checkup  He tells me that his anxiety is flared up and he would like to go back on sertraline  He tells me he is constantly thinking  He is constantly worrying about things  He has not had any panic attacks  He notes that his symptoms are worse when he drives  He tells me his wife suggested to him a few months ago that he scheduled an appointment to get back on sertraline  He takes alprazolam but infrequently  The following portions of the patient's history were reviewed and updated as appropriate: allergies, current medications, past family history, past medical history, past social history, past surgical history and problem list     Review of Systems   Constitutional: Negative for activity change, appetite change and unexpected weight change  Cardiovascular: Negative for chest pain, palpitations and leg swelling  Psychiatric/Behavioral: Negative for dysphoric mood and sleep disturbance  The patient is nervous/anxious            Objective:      /72 (BP Location: Left arm, Patient Position: Sitting, Cuff Size: Adult)   Pulse 72   Temp 98 °F (36 7 °C) (Temporal)   Ht 5' 10" (1 778 m)   Wt 77 9 kg (171 lb 12 8 oz)   SpO2 96%   BMI 24 65 kg/m²          Physical Exam  Vitals reviewed  Constitutional:       Comments: This is a 81-year-old white male who appears his stated age  He is pleasant, cooperative, and in no distress  HENT:      Head: Normocephalic and atraumatic  Right Ear: Tympanic membrane, ear canal and external ear normal  There is no impacted cerumen  Left Ear: Tympanic membrane, ear canal and external ear normal  There is no impacted cerumen  Mouth/Throat:      Mouth: Mucous membranes are moist       Pharynx: Oropharynx is clear  No oropharyngeal exudate or posterior oropharyngeal erythema  Eyes:      General: No scleral icterus  Right eye: No discharge  Left eye: No discharge  Conjunctiva/sclera: Conjunctivae normal       Pupils: Pupils are equal, round, and reactive to light  Cardiovascular:      Rate and Rhythm: Normal rate and regular rhythm  Heart sounds: Normal heart sounds  No murmur heard  No friction rub  No gallop  Pulmonary:      Effort: Pulmonary effort is normal  No respiratory distress  Breath sounds: Normal breath sounds  No stridor  No wheezing, rhonchi or rales  Abdominal:      General: Bowel sounds are normal  There is no distension  Palpations: Abdomen is soft  There is no mass  Tenderness: There is no abdominal tenderness  There is no guarding  Comments:   No organomegaly   Musculoskeletal:      Cervical back: Neck supple  Lymphadenopathy:      Cervical: No cervical adenopathy          Extremities: Without cyanosis, clubbing, or edema

## 2021-06-24 NOTE — PATIENT INSTRUCTIONS
Sunscreen (On the skin)   May help protect your skin from sunburn, skin cancer, and other skin damages caused by the sun  Brand Name(s): Aquaphor Lip Protectant+Sunscreen, Aveeno Absolutely Ageless w/SPF30, Aveeno Baby Continuous Protection, Aveeno Baby Continuous Protection Sunblock Lotion, Aveeno Baby Sunscreen, Aveeno Clear Complexion, Aveeno Daily Moisturizing Lotion With Sunscreen, Aveeno Kids Continuous Protection, Aveeno Kids Sunscreen, Aveeno Positively Mineral, Aveeno Positively Mineral Sunscreen, Aveeno Positively Mineral Sunscreen for Face, Aveeno Positively Radiant, Aveeno Positively Radiant Daily Moisturizer, Aveeno Protect + Hydrate   There may be other brand names for this medicine  When This Medicine Should Not Be Used:   Talk with your doctor if you have had an allergic reaction to a sunscreen  Even if you have had an allergic reaction to one sunscreen, you still may be able to use another sunscreen that contains different ingredients  How to Use This Medicine:   Cream, Gel/Jelly, Liquid, Ointment, Spray, Stick  · Most sunscreens are applied about 15 to 30 minutes before you go out into the sun  Sunscreens that contain PABA should be used about 1 to 2 hours before sun exposure  There are many different kinds of sunscreens, so instructions may change with each product  Always read and follow the instructions on the package label  · Use the sunscreen on your skin only  Do not get it in your eyes, nose, or mouth  Do not use the spray on your face  A sunscreen stick or lip balm especially made for your face is easy to use  · Apply a thick layer of sunscreen to all skin areas exposed to the sun  Reapply the sunscreen at least every 2 hours or more often, or after swimming, towel drying, or heavy sweating  · If the sunscreen contains alcohol, you should not use it near a fire or if you are smoking    How to Store and Dispose of This Medicine:   · Store the sunscreen at room temperature away from heat and direct light  Do not freeze  · Ask your pharmacist or doctor how to dispose of the medicine container and any leftover or  medicine  · Keep all medicine out of the reach of children  Never share your medicine with anyone  Drugs and Foods to Avoid:   Ask your doctor or pharmacist before using any other medicine, including over-the-counter medicines, vitamins, and herbal products  · Talk with your doctor before using a sunscreen on the same skin areas you are treating with other skin medicines  Warnings While Using This Medicine:   · You should not use sunscreens on children younger than 10months of age without checking first with your child's doctor  Children younger than 10months of age may absorb sunscreen through their skin into their bodies  It is best to keep babies younger than 10months of age out of the sun  If this cannot be avoided, lightweight clothes and a hat may help protect a baby from the sun  · Sunscreens work by either absorbing or reflecting the sun's ultraviolet radiation (UVR)  UVR causes skin cancer, sunburns, and premature aging of the skin (wrinkles and dry, thinning skin)  Clouds will filter some UVR, but not all  You will still need to use sunscreen even when it is cloudy  · UVR can reflect off of light surfaces such as sand and snow  Wear a sunscreen when you are out in the snow to keep from getting a sunburn  · Sunscreens are rated by SPFs (sun protection factors)  Use a sunscreen with an SPF of at least 15  Children older than 10months of age should wear a sunscreen of broad spectrum and with SPF 13 or higher when outdoors  If you need more protection or have fair skin, an SPF of 20 to 30 (or higher) can be used  You can also protect your skin by wearing a hat, sunglasses, and lightweight clothes    · To prevent skin damage, avoid being in the sun between the hours of 10 AM and 3 PM, when the sun's rays are strongest   · Sunscreens that contain PABA may permanently stain your clothing yellow  Possible Side Effects While Using This Medicine:   Call your doctor right away if you notice any of these side effects:  · Allergic reaction: Itching or hives, swelling in your face or hands, swelling or tingling in your mouth or throat, chest tightness, trouble breathing  If you notice these less serious side effects, talk with your doctor:   · Rash  If you notice other side effects that you think are caused by this medicine, tell your doctor  Call your doctor for medical advice about side effects  You may report side effects to FDA at 4-657-FDA-2076  © Copyright [address] 2021 Information is for End User's use only and may not be sold, redistributed or otherwise used for commercial purposes  The above information is an  only  It is not intended as medical advice for individual conditions or treatments  Talk to your doctor, nurse or pharmacist before following any medical regimen to see if it is safe and effective for you

## 2021-06-24 NOTE — ASSESSMENT & PLAN NOTE
The patient was started back on sertraline  He will take sertraline 50 mg, 1/2 tablet daily for 7 days  He will then increase the dose to 1 tablet daily  He may continue to take alprazolam as needed  I reminded him that this medication will take a while to start to work  I anticipate at least 4 weeks  I am going to schedule him a follow-up visit for 6 months  However, I advised the patient he should call me immediately if he has no improvement or worsening of his symptoms

## 2021-08-02 ENCOUNTER — TELEPHONE (OUTPATIENT)
Dept: FAMILY MEDICINE CLINIC | Facility: CLINIC | Age: 41
End: 2021-08-02
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
## 2021-08-02 RX ORDER — DOXYCYCLINE HYCLATE 100 MG/1
CAPSULE ORAL
Qty: 2 CAPSULE | Refills: 0 | Status: SHIPPED | OUTPATIENT
Start: 2021-08-02 | End: 2021-08-03

## 2021-08-02 NOTE — TELEPHONE ENCOUNTER
PT CALLED STATING HE PULLED A SMALL TICK OFF OF HIS ARM THIS MORNING  STATES HE WAS IN THE WOODS LAST NIGHT  WOULD LIKE FOR YOU TO CALL IN A PRESCRIPTION FOR DOXYCYCLINE  PLEASE ADVISE

## 2021-09-20 ENCOUNTER — TELEPHONE (OUTPATIENT)
Dept: FAMILY MEDICINE CLINIC | Facility: CLINIC | Age: 41
End: 2021-09-20

## 2021-09-20 NOTE — TELEPHONE ENCOUNTER
PT CALLED STATING HE DOES NOT FEEL HIS SERTRALINE IS WORKING PROPERLY AND WOULD LIKE TO KNOW IF YOU COULD INCREASE IT FROM 50 MG  MG  PLEASE ADVISE

## 2021-09-21 DIAGNOSIS — F41.1 GENERALIZED ANXIETY DISORDER: Primary | ICD-10-CM

## 2021-09-21 RX ORDER — SERTRALINE HYDROCHLORIDE 100 MG/1
100 TABLET, FILM COATED ORAL DAILY
Qty: 90 TABLET | Refills: 1 | Status: SHIPPED | OUTPATIENT
Start: 2021-09-21 | End: 2022-03-14

## 2021-09-21 NOTE — TELEPHONE ENCOUNTER
I sent a new prescription to [facility] for the 100 mg tablets   He can use up what he has and take 2 of the 50 mg tablets so they don't go to waste/

## 2021-12-27 ENCOUNTER — OFFICE VISIT (OUTPATIENT)
Dept: FAMILY MEDICINE CLINIC | Facility: CLINIC | Age: 41
End: 2021-12-27
Payer: COMMERCIAL

## 2021-12-27 VITALS
DIASTOLIC BLOOD PRESSURE: 82 MMHG | HEART RATE: 76 BPM | OXYGEN SATURATION: 94 % | TEMPERATURE: 97 F | BODY MASS INDEX: 25.65 KG/M2 | WEIGHT: 179.2 LBS | SYSTOLIC BLOOD PRESSURE: 126 MMHG | HEIGHT: 70 IN

## 2021-12-27 DIAGNOSIS — F41.1 GENERALIZED ANXIETY DISORDER: Chronic | ICD-10-CM

## 2021-12-27 DIAGNOSIS — R53.83 OTHER FATIGUE: Primary | ICD-10-CM

## 2021-12-27 DIAGNOSIS — E78.5 DYSLIPIDEMIA: ICD-10-CM

## 2021-12-27 PROCEDURE — 3725F SCREEN DEPRESSION PERFORMED: CPT | Performed by: FAMILY MEDICINE

## 2021-12-27 PROCEDURE — 99213 OFFICE O/P EST LOW 20 MIN: CPT | Performed by: FAMILY MEDICINE

## 2021-12-27 PROCEDURE — 1036F TOBACCO NON-USER: CPT | Performed by: FAMILY MEDICINE

## 2021-12-27 PROCEDURE — 3008F BODY MASS INDEX DOCD: CPT | Performed by: FAMILY MEDICINE

## 2022-03-12 DIAGNOSIS — F41.1 GENERALIZED ANXIETY DISORDER: ICD-10-CM

## 2022-03-14 RX ORDER — SERTRALINE HYDROCHLORIDE 100 MG/1
TABLET, FILM COATED ORAL
Qty: 90 TABLET | Refills: 1 | Status: SHIPPED | OUTPATIENT
Start: 2022-03-14 | End: 2022-04-20 | Stop reason: SDUPTHER

## 2022-06-30 ENCOUNTER — OFFICE VISIT (OUTPATIENT)
Dept: FAMILY MEDICINE CLINIC | Facility: CLINIC | Age: 42
End: 2022-06-30
Payer: COMMERCIAL

## 2022-06-30 VITALS
SYSTOLIC BLOOD PRESSURE: 132 MMHG | HEIGHT: 70 IN | OXYGEN SATURATION: 95 % | BODY MASS INDEX: 25.48 KG/M2 | DIASTOLIC BLOOD PRESSURE: 80 MMHG | TEMPERATURE: 96.7 F | WEIGHT: 178 LBS | HEART RATE: 60 BPM

## 2022-06-30 DIAGNOSIS — E78.00 PURE HYPERCHOLESTEROLEMIA: ICD-10-CM

## 2022-06-30 DIAGNOSIS — F41.1 GENERALIZED ANXIETY DISORDER: Primary | Chronic | ICD-10-CM

## 2022-06-30 PROCEDURE — 99214 OFFICE O/P EST MOD 30 MIN: CPT | Performed by: FAMILY MEDICINE

## 2022-06-30 NOTE — PROGRESS NOTES
Assessment/Plan:    Generalized anxiety disorder  Patient has generalized anxiety disorder  His symptoms are well controlled on sertraline 100 mg daily  He is tolerating the medicine well without any side effects  I will have him continue sertraline 100 mg daily  Patient has requested also office visit yearly rather than every 6 months for follow-up  I advised the patient he needs to contact me immediately if he has any worsening of his symptoms  Pure hypercholesterolemia  I reviewed with the patient his labs that he had done at the time of his last office visit  He does have elevated cholesterol  Given the fact that he has no other risk factors such as diabetes, hypertension, or smoking, I am not going to recommend lipid lowering therapy for him  We discussed following a low-fat/low-cholesterol diet  I also discussed that I suspect his problem may be hereditary  I planned repeat labs when I see him back       Diagnoses and all orders for this visit:    Generalized anxiety disorder    Pure hypercholesterolemia          Subjective:      Patient ID: Angelic Lozano is a 40 y o  male  This is a 43-year-old white male who presents to the office today for his follow-up visit  The patient is doing well  He reports compliance with his sertraline  He feels it is working well for him  He denies any anxiety  He denies excessive worry  He denies any depression  He denies sleep disturbance  He also denies any side effects from the sertraline  The following portions of the patient's history were reviewed and updated as appropriate: allergies, current medications, past family history, past medical history, past social history, past surgical history and problem list     Review of Systems   Gastrointestinal: Negative for abdominal distention, abdominal pain, blood in stool, constipation, diarrhea and nausea  Genitourinary: Negative for dysuria, frequency and urgency     Psychiatric/Behavioral: Negative for dysphoric mood and sleep disturbance  The patient is not nervous/anxious  Objective:      /80 (BP Location: Left arm, Patient Position: Sitting, Cuff Size: Adult)   Pulse 60   Temp (!) 96 7 °F (35 9 °C) (Tympanic)   Ht 5' 10" (1 778 m)   Wt 80 7 kg (178 lb)   SpO2 95%   BMI 25 54 kg/m²          Physical Exam  Vitals reviewed  Constitutional:       Comments: Patient is a 72-year-old white male who appears his stated age  He is pleasant, well-nourished, and in no distress  HENT:      Head: Normocephalic and atraumatic  Right Ear: Tympanic membrane, ear canal and external ear normal  There is no impacted cerumen  Left Ear: Tympanic membrane, ear canal and external ear normal  There is no impacted cerumen  Mouth/Throat:      Mouth: Mucous membranes are moist       Pharynx: Oropharynx is clear  No oropharyngeal exudate or posterior oropharyngeal erythema  Eyes:      General: No scleral icterus  Right eye: No discharge  Left eye: No discharge  Conjunctiva/sclera: Conjunctivae normal       Pupils: Pupils are equal, round, and reactive to light  Cardiovascular:      Rate and Rhythm: Normal rate and regular rhythm  Heart sounds: Normal heart sounds  No murmur heard  No friction rub  No gallop  Pulmonary:      Effort: Pulmonary effort is normal  No respiratory distress  Breath sounds: Normal breath sounds  No stridor  No wheezing, rhonchi or rales  Abdominal:      General: Bowel sounds are normal  There is no distension  Palpations: Abdomen is soft  There is no mass  Tenderness: There is no abdominal tenderness  There is no guarding  Comments: There is no organomegaly   Musculoskeletal:      Cervical back: Neck supple  Lymphadenopathy:      Cervical: No cervical adenopathy  Psychiatric:         Mood and Affect: Mood normal          Behavior: Behavior normal          Thought Content:  Thought content normal  Judgment: Judgment normal        extremities:  Without cyanosis, clubbing, or edema

## 2022-10-13 DIAGNOSIS — F41.1 GENERALIZED ANXIETY DISORDER: ICD-10-CM

## 2022-10-13 RX ORDER — SERTRALINE HYDROCHLORIDE 100 MG/1
TABLET, FILM COATED ORAL
Qty: 90 TABLET | Refills: 1 | Status: SHIPPED | OUTPATIENT
Start: 2022-10-13

## 2023-02-08 DIAGNOSIS — F41.1 GENERALIZED ANXIETY DISORDER: ICD-10-CM

## 2023-02-08 RX ORDER — SERTRALINE HYDROCHLORIDE 100 MG/1
100 TABLET, FILM COATED ORAL DAILY
Qty: 90 TABLET | Refills: 1 | Status: SHIPPED | OUTPATIENT
Start: 2023-02-08

## 2023-07-10 ENCOUNTER — OFFICE VISIT (OUTPATIENT)
Dept: FAMILY MEDICINE CLINIC | Facility: CLINIC | Age: 43
End: 2023-07-10
Payer: COMMERCIAL

## 2023-07-10 VITALS
DIASTOLIC BLOOD PRESSURE: 70 MMHG | WEIGHT: 177.3 LBS | SYSTOLIC BLOOD PRESSURE: 126 MMHG | HEART RATE: 60 BPM | OXYGEN SATURATION: 95 % | BODY MASS INDEX: 25.38 KG/M2 | HEIGHT: 70 IN | TEMPERATURE: 96.5 F

## 2023-07-10 DIAGNOSIS — R53.83 OTHER FATIGUE: ICD-10-CM

## 2023-07-10 DIAGNOSIS — Z00.00 ANNUAL PHYSICAL EXAM: Primary | ICD-10-CM

## 2023-07-10 DIAGNOSIS — F41.1 GENERALIZED ANXIETY DISORDER: Chronic | ICD-10-CM

## 2023-07-10 DIAGNOSIS — Z11.59 ENCOUNTER FOR HEPATITIS C SCREENING TEST FOR LOW RISK PATIENT: ICD-10-CM

## 2023-07-10 DIAGNOSIS — E78.5 DYSLIPIDEMIA: ICD-10-CM

## 2023-07-10 PROCEDURE — 99396 PREV VISIT EST AGE 40-64: CPT | Performed by: FAMILY MEDICINE

## 2023-07-10 RX ORDER — SERTRALINE HYDROCHLORIDE 25 MG/1
TABLET, FILM COATED ORAL
Qty: 30 TABLET | Refills: 0 | Status: SHIPPED | OUTPATIENT
Start: 2023-07-10 | End: 2023-07-15

## 2023-07-10 NOTE — ASSESSMENT & PLAN NOTE
Patient presented to the office today for his annual physical exam.  He will reports that he wants to wean himself off Zoloft. He already decreased his dose to 50 mg/day and he tells me he has been taking this dose for the past 2 and half months. He tells me he did try to wean off in the past but had to go back on it. He is still feeling still anxious although he really wants to get off the medication if possible. As such, he is already taking 50 mg a day for the past 2 months. I will decrease him to 25 mg a day for 2 weeks, then 25 mg every other day for 2 weeks, then stop. Patient should call me if he has any recurrence of his symptoms. I told the patient were dealing with generalized anxiety disorder not depression. It is likely that his symptoms are going to persist.  I recommended routine fasting blood work for the patient. He has a history of hyperlipidemia so I ordered a repeat lipid panel. He has never had a hepatitis C antibody test so I will order that today. We will also check a CBC with differential, CMP, and TSH.

## 2023-07-10 NOTE — PROGRESS NOTES
ADULT ANNUAL [facility] PR[facility]    NAME: Pinky Mario  AGE: 44 y.o. SEX: male  : 1980     DATE: 7/10/2023     Assessment and Plan:     Problem List Items Addressed This Visit        Other    Generalized anxiety disorder (Chronic)    Relevant Medications    sertraline (ZOLOFT) 25 mg tablet    Other fatigue    Relevant Orders    CBC and differential    TSH, 3rd generation with Free T4 reflex    Annual physical exam - Primary     Patient presented to the office today for his annual physical exam.  He will reports that he wants to wean himself off Zoloft. He already decreased his dose to 50 mg/day and he tells me he has been taking this dose for the past 2 and half months. He tells me he did try to wean off in the past but had to go back on it. He is still feeling still anxious although he really wants to get off the medication if possible. As such, he is already taking 50 mg a day for the past 2 months. I will decrease him to 25 mg a day for 2 weeks, then 25 mg every other day for 2 weeks, then stop. Patient should call me if he has any recurrence of his symptoms. I told the patient were dealing with generalized anxiety disorder not depression. It is likely that his symptoms are going to persist.  I recommended routine fasting blood work for the patient. He has a history of hyperlipidemia so I ordered a repeat lipid panel. He has never had a hepatitis C antibody test so I will order that today. We will also check a CBC with differential, CMP, and TSH. Encounter for hepatitis C screening test for low risk patient    Relevant Orders    Hepatitis C antibody    Dyslipidemia    Relevant Orders    Comprehensive metabolic panel    Lipid panel       Immunizations and preventive care screenings were discussed with patient today. Appropriate education was printed on patient's after visit summary.         Counseling:  · Exercise: the importance of regular exercise/physical activity was discussed. Recommend exercise 3-5 times per week for at least 30 minutes. BMI Counseling: Body mass index is 25.44 kg/m². The BMI is above normal. Exercise recommendations include exercising 3-5 times per week. Rationale for BMI follow-up plan is due to patient being overweight or obese. Depression Screening and Follow-up Plan: Patient was screened for depression during today's encounter. They screened negative with a PHQ-2 score of 0. Tobacco Cessation Counseling: Tobacco cessation counseling was provided. The patient is sincerely urged to quit consumption of tobacco. He is not ready to quit tobacco.         Return in about 1 year (around 7/10/2024) for Annual physical.     Chief Complaint:     Chief Complaint   Patient presents with   • Annual Exam      History of Present Illness:     Adult Annual Physical   Patient here for a comprehensive physical exam. The patient reports no problems. Diet and Physical Activity  · Diet/Nutrition: well balanced diet. · Exercise: 1-2 times a week on average. Depression Screening  PHQ-2/9 Depression Screening    Little interest or pleasure in doing things: 0 - not at all  Feeling down, depressed, or hopeless: 0 - not at all  PHQ-2 Score: 0  PHQ-2 Interpretation: Negative depression screen       General Health  · Sleep: gets 7-8 hours of sleep on average. · Hearing: normal - bilateral.  · Vision: vision problems: reports hard time focusing. · Dental: regular dental visits.  Health  · Symptoms include: none     Review of Systems:     Review of Systems   Cardiovascular: Negative for chest pain, palpitations and leg swelling. Gastrointestinal: Negative for abdominal distention, abdominal pain, blood in stool, constipation, diarrhea and nausea. Genitourinary:        Denies nocturia, weak urinary stream, and erectile dysfunction   Psychiatric/Behavioral: Negative for dysphoric mood.  The patient is nervous/anxious. Past Medical History:     Past Medical History:   Diagnosis Date   • Anxiety       Past Surgical History:     Past Surgical History:   Procedure Laterality Date   • ORCHIOPEXY Right       Family History:     Family History   Problem Relation Age of Onset   • No Known Problems Mother    • COPD Father    • Breast cancer Maternal Grandmother    • Diabetes Maternal Grandfather       Social History:     Social History     Socioeconomic History   • Marital status: Single     Spouse name: None   • Number of children: None   • Years of education: None   • Highest education level: None   Occupational History   • None   Tobacco Use   • Smoking status: Former     Packs/day: 0.50     Years: 2.00     Total pack years: 1.00     Types: Cigarettes     Start date:      Quit date:      Years since quittin.5   • Smokeless tobacco: Current     Types: Chew   • Tobacco comments:     Patient using nicotine pouches. It is just nicotine in the pouch. Vaping Use   • Vaping Use: Never used   Substance and Sexual Activity   • Alcohol use: Yes     Alcohol/week: 3.0 standard drinks of alcohol     Types: 2 Cans of beer, 1 Standard drinks or equivalent per week   • Drug use: Never   • Sexual activity: None   Other Topics Concern   • None   Social History Narrative   • None     Social Determinants of Health     Financial Resource Strain: Not on file   Food Insecurity: Not on file   Transportation Needs: Not on file   Physical Activity: Not on file   Stress: Not on file   Social Connections: Not on file   Intimate Partner Violence: Not on file   Housing Stability: Not on file      Current Medications:     Current Outpatient Medications   Medication Sig Dispense Refill   • sertraline (ZOLOFT) 25 mg tablet Take 1 tablet daily x 2 weeks, then 1 tablet every other day x 2 weeks, then stop 30 tablet 0     No current facility-administered medications for this visit.       Allergies:     No Known Allergies   Physical Exam:     /70 (BP Location: Left arm, Patient Position: Sitting, Cuff Size: Adult)   Pulse 60   Temp (!) 96.5 °F (35.8 °C) (Tympanic)   Ht 5' 10" (1.778 m)   Wt 80.4 kg (177 lb 4.8 oz)   SpO2 95%   BMI 25.44 kg/m²     Physical Exam  Vitals reviewed. Constitutional:       Comments: This is a 23-year-old white male who appears his stated age. Patient is pleasant, cooperative, and in no distress   HENT:      Head: Normocephalic and atraumatic. Right Ear: Tympanic membrane, ear canal and external ear normal. There is no impacted cerumen. Left Ear: Tympanic membrane, ear canal and external ear normal. There is no impacted cerumen. Mouth/Throat:      Mouth: Mucous membranes are moist.      Pharynx: Oropharynx is clear. No oropharyngeal exudate or posterior oropharyngeal erythema. Eyes:      General: No scleral icterus. Right eye: No discharge. Left eye: No discharge. Conjunctiva/sclera: Conjunctivae normal.      Pupils: Pupils are equal, round, and reactive to light. Neck:      Comments: No thyromegaly  Cardiovascular:      Rate and Rhythm: Normal rate and regular rhythm. Heart sounds: Normal heart sounds. No murmur heard. No friction rub. No gallop. Pulmonary:      Effort: Pulmonary effort is normal. No respiratory distress. Breath sounds: No stridor. No wheezing, rhonchi or rales. Abdominal:      General: Bowel sounds are normal. There is no distension. Palpations: Abdomen is soft. There is no mass. Tenderness: There is no abdominal tenderness. There is no guarding. Comments: No organomegaly is noted   Musculoskeletal:      Cervical back: Neck supple. Lymphadenopathy:      Cervical: No cervical adenopathy. Psychiatric:         Mood and Affect: Mood normal.         Behavior: Behavior normal.         Thought Content:  Thought content normal.         Judgment: Judgment normal.     Extremities: Without cyanosis, clubbing, or luis Sol DO  [facility] PRIMARY [facility]

## 2023-07-15 DIAGNOSIS — F41.1 GENERALIZED ANXIETY DISORDER: Chronic | ICD-10-CM

## 2023-07-15 RX ORDER — SERTRALINE HYDROCHLORIDE 25 MG/1
TABLET, FILM COATED ORAL
Qty: 90 TABLET | Refills: 1 | Status: SHIPPED | OUTPATIENT
Start: 2023-07-15

## 2023-08-16 ENCOUNTER — TELEPHONE (OUTPATIENT)
Dept: INTERNAL MEDICINE CLINIC | Facility: CLINIC | Age: 43
End: 2023-08-16

## 2023-08-16 ENCOUNTER — DOCUMENTATION (OUTPATIENT)
Dept: FAMILY MEDICINE CLINIC | Facility: CLINIC | Age: 43
End: 2023-08-16

## 2023-08-16 ENCOUNTER — TELEPHONE (OUTPATIENT)
Dept: FAMILY MEDICINE CLINIC | Facility: CLINIC | Age: 43
End: 2023-08-16

## 2023-08-18 ENCOUNTER — TELEPHONE (OUTPATIENT)
Dept: FAMILY MEDICINE CLINIC | Facility: CLINIC | Age: 43
End: 2023-08-18

## 2023-08-21 ENCOUNTER — TELEPHONE (OUTPATIENT)
Dept: FAMILY MEDICINE CLINIC | Facility: CLINIC | Age: 43
End: 2023-08-21

## 2023-08-23 ENCOUNTER — TELEPHONE (OUTPATIENT)
Dept: FAMILY MEDICINE CLINIC | Facility: CLINIC | Age: 43
End: 2023-08-23

## 2023-08-23 DIAGNOSIS — F41.1 GENERALIZED ANXIETY DISORDER: Primary | Chronic | ICD-10-CM

## 2023-08-23 RX ORDER — ALPRAZOLAM 0.5 MG/1
0.5 TABLET ORAL 3 TIMES DAILY PRN
Qty: 30 TABLET | Refills: 2 | Status: SHIPPED | OUTPATIENT
Start: 2023-08-23

## 2023-12-30 DIAGNOSIS — F41.1 GENERALIZED ANXIETY DISORDER: Chronic | ICD-10-CM

## 2024-01-02 RX ORDER — ALPRAZOLAM 0.5 MG/1
0.5 TABLET ORAL 3 TIMES DAILY PRN
Qty: 30 TABLET | Refills: 2 | Status: SHIPPED | OUTPATIENT
Start: 2024-01-02

## 2024-01-02 NOTE — PROGRESS NOTES
The [place] prescription drug monitoring program was queried.  There were no red flags.  Safe to proceed.

## 2024-02-21 PROBLEM — J02.9 ACUTE PHARYNGITIS: Status: RESOLVED | Noted: 2020-10-13 | Resolved: 2024-02-21

## 2024-06-27 DIAGNOSIS — F41.1 GENERALIZED ANXIETY DISORDER: Chronic | ICD-10-CM

## 2024-06-27 RX ORDER — ALPRAZOLAM 0.5 MG/1
0.5 TABLET ORAL 3 TIMES DAILY PRN
Qty: 30 TABLET | Refills: 1 | Status: SHIPPED | OUTPATIENT
Start: 2024-06-27

## 2024-07-15 ENCOUNTER — OFFICE VISIT (OUTPATIENT)
Dept: FAMILY MEDICINE CLINIC | Facility: CLINIC | Age: 44
End: 2024-07-15
Payer: COMMERCIAL

## 2024-07-15 VITALS
HEIGHT: 70 IN | WEIGHT: 179.9 LBS | BODY MASS INDEX: 25.75 KG/M2 | TEMPERATURE: 97.5 F | DIASTOLIC BLOOD PRESSURE: 72 MMHG | SYSTOLIC BLOOD PRESSURE: 125 MMHG | HEART RATE: 57 BPM | OXYGEN SATURATION: 96 %

## 2024-07-15 DIAGNOSIS — Q04.8 CEREBELLAR TONSILLAR ECTOPIA (HCC): ICD-10-CM

## 2024-07-15 DIAGNOSIS — Z00.00 ANNUAL PHYSICAL EXAM: Primary | ICD-10-CM

## 2024-07-15 DIAGNOSIS — E78.5 DYSLIPIDEMIA: ICD-10-CM

## 2024-07-15 DIAGNOSIS — R53.83 OTHER FATIGUE: ICD-10-CM

## 2024-07-15 PROCEDURE — 99396 PREV VISIT EST AGE 40-64: CPT | Performed by: FAMILY MEDICINE

## 2024-07-15 NOTE — PROGRESS NOTES
Adult Annual Physical  Name: Everardo Marcus      : 1980      MRN: [id number]  Encounter Provider: Kirby Chang DO  Encounter Date: 7/15/2024   Encounter department: [facility] PRIMARY [facility]ARE    Assessment & Plan   1. Annual physical exam  Assessment & Plan:  This is a 44-year-old white male who presents to the office today for his annual physical exam.  The patient is doing well and has no complaints.  When I last saw him, he was in the process of weaning off sertraline.  He was able to do so successfully.  He currently takes alprazolam as needed which he finds to be very helpful.  He only takes a half a tablet at a time and he does not have to take them very often.  He states that his anxiety is well-controlled.  The patient has been exercising regularly.  He recently participated in a run.  He is planning on participating in a triathlon in the future.  I reviewed the patient's family history.  His father  from COPD.  His mother is alive and well.  His maternal grandfather had diabetes mellitus and  of a myocardial infarction.  Paternal grandfather  of a myocardial infarction.  Paternal grandmother  from breast cancer.  Paternal grandmother  in her 90s.  He has a brother who is alive and well.  Patient's physical exam was unremarkable.  I encouraged the patient to continue to exercise.  I recommended annual influenza vaccine for the patient in the fall.  Routine blood work was also recommended and ordered.  2. Dyslipidemia  -     Lipid Panel with Direct LDL reflex; Future  -     Comprehensive metabolic panel; Future  3. Other fatigue  -     CBC and differential; Future  -     TSH, 3rd generation with Free T4 reflex; Future  4. Cerebellar tonsillar ectopia (HCC)    Immunizations and preventive care screenings were discussed with patient today. Appropriate education was printed on patient's after visit summary.        Counseling:  Exercise: the importance of regular  "exercise/physical activity was discussed. Recommend exercise 3-5 times per week for at least 30 minutes.          History of Present Illness     Adult Annual Physical:  Patient presents for annual physical.     Diet and Physical Activity:  - Diet/Nutrition: well balanced diet.  - Exercise: moderate cardiovascular exercise.    Depression Screening:  - PHQ-2 Score: 0    General Health:  - Sleep: 7-8 hours of sleep on average.  - Hearing: normal hearing right ear.  - Vision: no vision problems and wears glasses.  - Dental: regular dental visits.     Health:    - Urinary symptoms: none.     Review of Systems   HENT:  Positive for hearing loss.    Eyes:  Negative for visual disturbance.   Cardiovascular:  Negative for chest pain, palpitations and leg swelling.   Gastrointestinal:  Negative for abdominal distention, abdominal pain, blood in stool, constipation, diarrhea and nausea.   Genitourinary:         Denies nocturia, weak urinary stream, dribbling, and erectile dysfunction.         Objective     /72   Pulse 57   Temp 97.5 °F (36.4 °C) (Tympanic)   Ht 5' 10\" (1.778 m)   Wt 81.6 kg (179 lb 14.4 oz)   SpO2 96%   BMI 25.81 kg/m²     Physical Exam  Vitals reviewed.   Constitutional:       Comments: Patient is a 44-year-old white male who appears his stated age.  He is pleasant, cooperative, and in no apparent distress.   HENT:      Head: Normocephalic and atraumatic.      Right Ear: Tympanic membrane, ear canal and external ear normal. There is no impacted cerumen.      Left Ear: Tympanic membrane, ear canal and external ear normal. There is no impacted cerumen.      Mouth/Throat:      Mouth: Mucous membranes are moist.      Pharynx: Oropharynx is clear. No oropharyngeal exudate or posterior oropharyngeal erythema.   Eyes:      General: No scleral icterus.        Right eye: No discharge.         Left eye: No discharge.      Conjunctiva/sclera: Conjunctivae normal.      Pupils: Pupils are equal, round, and " reactive to light.   Neck:      Vascular: No carotid bruit.      Comments: No thyromegaly was appreciated  Cardiovascular:      Rate and Rhythm: Normal rate and regular rhythm.      Heart sounds: Normal heart sounds. No murmur heard.     No friction rub. No gallop.   Pulmonary:      Effort: Pulmonary effort is normal. No respiratory distress.      Breath sounds: Normal breath sounds. No stridor. No wheezing, rhonchi or rales.   Abdominal:      General: Bowel sounds are normal. There is no distension.      Palpations: Abdomen is soft. There is no mass.      Tenderness: There is no abdominal tenderness. There is no guarding.      Comments: There was no hepatosplenomegaly noted on exam   Musculoskeletal:      Cervical back: Neck supple.   Lymphadenopathy:      Cervical: No cervical adenopathy.   Psychiatric:         Mood and Affect: Mood normal.         Behavior: Behavior normal.         Thought Content: Thought content normal.         Judgment: Judgment normal.

## 2024-07-15 NOTE — ASSESSMENT & PLAN NOTE
This is a 44-year-old white male who presents to the office today for his annual physical exam.  The patient is doing well and has no complaints.  When I last saw him, he was in the process of weaning off sertraline.  He was able to do so successfully.  He currently takes alprazolam as needed which he finds to be very helpful.  He only takes a half a tablet at a time and he does not have to take them very often.  He states that his anxiety is well-controlled.  The patient has been exercising regularly.  He recently participated in a run.  He is planning on participating in a triathlon in the future.  I reviewed the patient's family history.  His father  from COPD.  His mother is alive and well.  His maternal grandfather had diabetes mellitus and  of a myocardial infarction.  Paternal grandfather  of a myocardial infarction.  Paternal grandmother  from breast cancer.  Paternal grandmother  in her 90s.  He has a brother who is alive and well.  Patient's physical exam was unremarkable.  I encouraged the patient to continue to exercise.  I recommended annual influenza vaccine for the patient in the fall.  Routine blood work was also recommended and ordered.

## 2024-08-01 LAB
ALBUMIN SERPL-MCNC: 4.4 G/DL (ref 3.5–5.7)
ALP SERPL-CCNC: 42 U/L (ref 35–120)
ALT SERPL-CCNC: 11 U/L
ANION GAP SERPL CALCULATED.3IONS-SCNC: 9 MMOL/L (ref 3–11)
AST SERPL-CCNC: 14 U/L
BASOPHILS # BLD AUTO: 0 THOU/CMM (ref 0–0.1)
BASOPHILS NFR BLD AUTO: 1 %
BILIRUB SERPL-MCNC: 0.7 MG/DL (ref 0.2–1)
BUN SERPL-MCNC: 19 MG/DL (ref 7–28)
CALCIUM SERPL-MCNC: 9.4 MG/DL (ref 8.5–10.1)
CHLORIDE SERPL-SCNC: 102 MMOL/L (ref 100–109)
CHOLEST SERPL-MCNC: 221 MG/DL
CHOLEST/HDLC SERPL: 3.6 {RATIO}
CO2 SERPL-SCNC: 28 MMOL/L (ref 21–31)
CREAT SERPL-MCNC: 1 MG/DL (ref 0.53–1.3)
CYTOLOGY CMNT CVX/VAG CYTO-IMP: NORMAL
DIFFERENTIAL METHOD BLD: NORMAL
EOSINOPHIL # BLD AUTO: 0.1 THOU/CMM (ref 0–0.5)
EOSINOPHIL NFR BLD AUTO: 3 %
ERYTHROCYTE [DISTWIDTH] IN BLOOD BY AUTOMATED COUNT: 13.6 % (ref 12–16)
GFR/BSA.PRED SERPLBLD CYS-BASED-ARV: 96 ML/MIN/{1.73_M2}
GLUCOSE SERPL-MCNC: 93 MG/DL (ref 65–99)
HCT VFR BLD AUTO: 40.5 % (ref 37–48)
HDLC SERPL-MCNC: 62 MG/DL (ref 23–92)
HGB BLD-MCNC: 13.6 G/DL (ref 12.5–17)
LDLC SERPL CALC-MCNC: 144 MG/DL
LYMPHOCYTES # BLD AUTO: 2 THOU/CMM (ref 1–3)
LYMPHOCYTES NFR BLD AUTO: 42 %
MCH RBC QN AUTO: 31 PG (ref 27–36)
MCHC RBC AUTO-ENTMCNC: 33.6 G/DL (ref 32–37)
MCV RBC AUTO: 92 FL (ref 80–100)
MONOCYTES # BLD AUTO: 0.5 THOU/CMM (ref 0.3–1)
MONOCYTES NFR BLD AUTO: 10 %
NEUTROPHILS # BLD AUTO: 2.1 THOU/CMM (ref 1.8–7.8)
NEUTROPHILS NFR BLD AUTO: 44 %
NONHDLC SERPL-MCNC: 159 MG/DL
PLATELET # BLD AUTO: 231 THOU/CMM (ref 140–350)
PMV BLD REES-ECKER: 8.3 FL (ref 7.5–11.3)
POTASSIUM SERPL-SCNC: 4.8 MMOL/L (ref 3.5–5.2)
PROT SERPL-MCNC: 6.7 G/DL (ref 6.3–8.3)
RBC # BLD AUTO: 4.39 MILL/CMM (ref 4–5.4)
SODIUM SERPL-SCNC: 139 MMOL/L (ref 135–145)
TRIGL SERPL-MCNC: 75 MG/DL
TSH SERPL-ACNC: 4.59 UIU/ML (ref 0.45–5.33)
WBC # BLD AUTO: 4.8 THOU/CMM (ref 4–10.5)

## 2024-08-02 ENCOUNTER — TELEPHONE (OUTPATIENT)
Dept: FAMILY MEDICINE CLINIC | Facility: CLINIC | Age: 44
End: 2024-08-02

## 2024-08-02 NOTE — TELEPHONE ENCOUNTER
----- Message from Kirby Chang DO sent at 8/2/2024  9:00 AM EDT -----  Blood sugar, kidneys and liver are normal. Thyroid is normal. T Chol is 221 (<2000, LDL Chol is 144 (<100). I am not recommending medication for the cholesterol. He needs to watch his diet and exericise.

## 2024-11-05 ENCOUNTER — NURSE TRIAGE (OUTPATIENT)
Age: 44
End: 2024-11-05
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
## 2024-11-05 RX ORDER — DOXYCYCLINE 100 MG/1
CAPSULE ORAL
Qty: 2 CAPSULE | Refills: 0 | Status: SHIPPED | OUTPATIENT
Start: 2024-11-05 | End: 2024-11-06

## 2024-11-05 NOTE — TELEPHONE ENCOUNTER
"Everardo reports he fully removed an embedded tick today from his arm. He states the tick was still flat, thinks it was probably embedded for about a day. He reports redness and soreness at the site of the bite.    Everardo requests doxycycline prescription to be sent to [facility] in [place], states he has taken doxycycline in the past for a tick bite.     Advised office visit for evaluation and offered to schedule with PCP today. Everardo declines, asks if PCP will send prescription without visit.     Reason for Disposition   Red or very tender (to touch) area and started over 24 hours after the bite    Answer Assessment - Initial Assessment Questions  1. ATTACHED:  \"Is the tick still on the skin?\"  (e.g., yes, no, unsure)      No. Removed today    2. ONSET - TICK NOT STILL ATTACHED: \"If the tick has been removed, how long do you think the tick was attached before you removed it?\" (e.g., 5 hours, 2 days). \"When was this?\"      Uncertain of time that tick was attached, pt feels it was likely one day    3. LOCATION: \"Where is the tick bite located?\" (e.g., arm, leg)      Arm    4. TYPE of TICK: \"Is it a wood tick or a deer tick?\" (e.g., deer tick, wood tick; unsure)      Unsure    5. ENGORGED: \"Did the tick look flat or engorged (full, swollen)?\" (e.g., flat, engorged; unsure)      Tick appeared flat    6. OTHER SYMPTOMS: \"Do you have any other symptoms?\" (e.g., fever, rash, redness at bite area, red ring around bite)      Redness and soreness at site of bite    Protocols used: Tick Bite-Adult-[place]    "

## 2025-01-22 DIAGNOSIS — F41.1 GENERALIZED ANXIETY DISORDER: Chronic | ICD-10-CM

## 2025-01-23 RX ORDER — ALPRAZOLAM 0.5 MG
0.5 TABLET ORAL 3 TIMES DAILY PRN
Qty: 30 TABLET | Refills: 2 | Status: SHIPPED | OUTPATIENT
Start: 2025-01-23

## 2025-05-13 ENCOUNTER — OFFICE VISIT (OUTPATIENT)
Dept: FAMILY MEDICINE CLINIC | Facility: CLINIC | Age: 45
End: 2025-05-13
Payer: COMMERCIAL

## 2025-05-13 VITALS
HEIGHT: 70 IN | BODY MASS INDEX: 26.61 KG/M2 | TEMPERATURE: 97 F | WEIGHT: 185.9 LBS | HEART RATE: 55 BPM | DIASTOLIC BLOOD PRESSURE: 82 MMHG | OXYGEN SATURATION: 96 % | SYSTOLIC BLOOD PRESSURE: 124 MMHG

## 2025-05-13 DIAGNOSIS — R25.1 TREMOR: Primary | ICD-10-CM

## 2025-05-13 DIAGNOSIS — Q04.8 CEREBELLAR TONSILLAR ECTOPIA (HCC): ICD-10-CM

## 2025-05-13 DIAGNOSIS — F41.1 GENERALIZED ANXIETY DISORDER: Chronic | ICD-10-CM

## 2025-05-13 DIAGNOSIS — E78.5 DYSLIPIDEMIA: ICD-10-CM

## 2025-05-13 PROCEDURE — 99214 OFFICE O/P EST MOD 30 MIN: CPT | Performed by: FAMILY MEDICINE

## 2025-05-13 NOTE — ASSESSMENT & PLAN NOTE
Patient had taken sertraline in the past did very well with it.  I think we need to restart this patient on the sertraline.  I am going to have him start 25 mg daily at bedtime for a week and then increase dose to 50 mg at bedtime.  The patient was advised that he may take alprazolam as needed.  I scheduled a follow-up visit to reevaluate the patient in 6 weeks.  Orders:    sertraline (ZOLOFT) 50 mg tablet; Take 1/2 tablet daily at bedtime x 7 days, then 1 tablet daily at bedtime

## 2025-05-13 NOTE — ASSESSMENT & PLAN NOTE
Patient has tremor.  I cannot rule out early essential tremor although I think this is all anxiety related.  I am going to recommend we check labs on the patient to rule out hyperthyroidism and other causes.  CBC with differential, CMP, TSH, and vitamin D level were ordered.  Orders:    CBC and differential; Future    Comprehensive metabolic panel; Future    TSH, 3rd generation with Free T4 reflex; Future    Vitamin D 25 hydroxy; Future

## 2025-05-13 NOTE — ASSESSMENT & PLAN NOTE
I was able to review a CT scan that the patient had done in October, 2023.  He had a Chiari I malformation.  I do not think that his symptoms are related to this.  However, if they persist on going to recommend a repeat CT scan as well as consultation with neurology.  I will make that determination when I see the patient for his follow-up visit.

## 2025-05-13 NOTE — PROGRESS NOTES
Name: Everardo Marcus      : 1980      MRN: [id number]  Encounter Provider: Kirby Chang DO  Encounter Date: 2025   Encounter department: [facility] PRIMARY CARE  :  Assessment & Plan  Tremor  Patient has tremor.  I cannot rule out early essential tremor although I think this is all anxiety related.  I am going to recommend we check labs on the patient to rule out hyperthyroidism and other causes.  CBC with differential, CMP, TSH, and vitamin D level were ordered.  Orders:    CBC and differential; Future    Comprehensive metabolic panel; Future    TSH, 3rd generation with Free T4 reflex; Future    Vitamin D 25 hydroxy; Future    Generalized anxiety disorder  Patient had taken sertraline in the past did very well with it.  I think we need to restart this patient on the sertraline.  I am going to have him start 25 mg daily at bedtime for a week and then increase dose to 50 mg at bedtime.  The patient was advised that he may take alprazolam as needed.  I scheduled a follow-up visit to reevaluate the patient in 6 weeks.  Orders:    sertraline (ZOLOFT) 50 mg tablet; Take 1/2 tablet daily at bedtime x 7 days, then 1 tablet daily at bedtime    Dyslipidemia    Orders:    Lipid Panel with Direct LDL reflex; Future    Cerebellar tonsillar ectopia (HCC)  I was able to review a CT scan that the patient had done in .  He had a Chiari I malformation.  I do not think that his symptoms are related to this.  However, if they persist on going to recommend a repeat CT scan as well as consultation with neurology.  I will make that determination when I see the patient for his follow-up visit.              History of Present Illness   This patient is a 44-year-old white male who presents to the office today complaining of head tremors.  He tells me he first noticed it about a year ago.  His wife is also noticed it.  He denies any tremors in his hands or elsewhere.  He complains of anxiety.  He notes  "that recently, he was seated for photo and he tells me he has head Turning to the right uncontrollably and his head was shaking.  His wife was getting angry at him for this.  He does complain of chronic tension in his neck and across his shoulders.      Review of Systems   Constitutional:  Negative for chills and fever.   Cardiovascular:  Negative for chest pain, palpitations and leg swelling.   Gastrointestinal:  Negative for abdominal distention, abdominal pain, blood in stool, constipation, diarrhea and nausea.   Neurological:  Negative for dizziness, tremors, light-headedness and headaches.   Psychiatric/Behavioral:  The patient is nervous/anxious.        Objective   /82 (BP Location: Left arm, Patient Position: Sitting)   Pulse 55   Temp (!) 97 °F (36.1 °C) (Tympanic)   Ht 5' 10\" (1.778 m)   Wt 84.3 kg (185 lb 14.4 oz)   SpO2 96%   BMI 26.67 kg/m²      Physical Exam  Vitals reviewed.   Constitutional:       Comments: This is a 44-year-old white male who appears his stated age.  The patient is pleasant, cooperative, and in no distress.   HENT:      Head: Normocephalic and atraumatic.      Right Ear: Tympanic membrane, ear canal and external ear normal. There is no impacted cerumen.      Left Ear: Tympanic membrane, ear canal and external ear normal. There is no impacted cerumen.      Mouth/Throat:      Mouth: Mucous membranes are moist.      Pharynx: Oropharynx is clear. No oropharyngeal exudate or posterior oropharyngeal erythema.   Eyes:      General: No scleral icterus.        Right eye: No discharge.         Left eye: No discharge.      Conjunctiva/sclera: Conjunctivae normal.      Pupils: Pupils are equal, round, and reactive to light.   Cardiovascular:      Rate and Rhythm: Normal rate and regular rhythm.      Heart sounds: Normal heart sounds. No murmur heard.     No friction rub. No gallop.   Pulmonary:      Effort: Pulmonary effort is normal. No respiratory distress.      Breath sounds: " Normal breath sounds. No stridor. No wheezing, rhonchi or rales.   Musculoskeletal:      Cervical back: Neck supple.   Lymphadenopathy:      Cervical: No cervical adenopathy.   Neurological:      Mental Status: He is oriented to person, place, and time.      Cranial Nerves: No cranial nerve deficit.      Motor: No weakness.      Coordination: Coordination normal.      Gait: Gait normal.      Deep Tendon Reflexes: Reflexes normal.      Comments: I did observe slight head tremors.  However, this the patient became more and more nervous, his head kept turning to the right and his lips were even slightly quivering.  Romberg test was negative  Cerebellar function testing was normal with finger-to-nose

## 2025-05-14 LAB
25(OH)D3+25(OH)D2 SERPL-MCNC: 46 NG/ML (ref 30–100)
ALBUMIN SERPL-MCNC: 4.6 G/DL (ref 3.5–5.7)
ALP SERPL-CCNC: 45 U/L (ref 35–120)
ALT SERPL-CCNC: 20 U/L
ANION GAP SERPL CALCULATED.3IONS-SCNC: 8 MMOL/L (ref 3–11)
AST SERPL-CCNC: 18 U/L
BASOPHILS # BLD AUTO: 0 THOU/CMM (ref 0–0.1)
BASOPHILS NFR BLD AUTO: 1 %
BILIRUB SERPL-MCNC: 0.7 MG/DL (ref 0.2–1)
BUN SERPL-MCNC: 15 MG/DL (ref 7–28)
CALCIUM SERPL-MCNC: 9.2 MG/DL (ref 8.5–10.5)
CHLORIDE SERPL-SCNC: 103 MMOL/L (ref 100–109)
CHOLEST SERPL-MCNC: 226 MG/DL
CHOLEST/HDLC SERPL: 3.9 {RATIO}
CO2 SERPL-SCNC: 26 MMOL/L (ref 21–31)
CREAT SERPL-MCNC: 0.91 MG/DL (ref 0.53–1.3)
CYTOLOGY CMNT CVX/VAG CYTO-IMP: NORMAL
DIFFERENTIAL METHOD BLD: ABNORMAL
EOSINOPHIL # BLD AUTO: 0.1 THOU/CMM (ref 0–0.5)
EOSINOPHIL NFR BLD AUTO: 3 %
ERYTHROCYTE [DISTWIDTH] IN BLOOD BY AUTOMATED COUNT: 13.4 % (ref 12–16)
GFR/BSA.PRED SERPLBLD CYS-BASED-ARV: 107 ML/MIN/{1.73_M2}
GLUCOSE SERPL-MCNC: 98 MG/DL (ref 65–99)
HCT VFR BLD AUTO: 38.8 % (ref 37–48)
HDLC SERPL-MCNC: 58 MG/DL (ref 23–92)
HGB BLD-MCNC: 12.9 G/DL (ref 12.5–17)
LDLC SERPL CALC-MCNC: 152 MG/DL
LYMPHOCYTES # BLD AUTO: 1.9 THOU/CMM (ref 1–3)
LYMPHOCYTES NFR BLD AUTO: 46 %
MCH RBC QN AUTO: 28.7 PG (ref 27–36)
MCHC RBC AUTO-ENTMCNC: 33.2 G/DL (ref 32–37)
MCV RBC AUTO: 86 FL (ref 80–100)
MONOCYTES # BLD AUTO: 0.4 THOU/CMM (ref 0.3–1)
MONOCYTES NFR BLD AUTO: 11 %
NEUTROPHILS # BLD AUTO: 1.6 THOU/CMM (ref 1.8–7.8)
NEUTROPHILS NFR BLD AUTO: 39 %
NONHDLC SERPL-MCNC: 168 MG/DL
PLATELET # BLD AUTO: 258 THOU/CMM (ref 140–350)
PMV BLD REES-ECKER: 8.3 FL (ref 7.5–11.3)
POTASSIUM SERPL-SCNC: 4.5 MMOL/L (ref 3.5–5.2)
PROT SERPL-MCNC: 6.9 G/DL (ref 6.3–8.3)
RBC # BLD AUTO: 4.49 MILL/CMM (ref 4–5.4)
SODIUM SERPL-SCNC: 137 MMOL/L (ref 135–145)
TRIGL SERPL-MCNC: 82 MG/DL
TSH SERPL-ACNC: 4.07 UIU/ML (ref 0.45–5.33)
WBC # BLD AUTO: 4.2 THOU/CMM (ref 4–10.5)

## 2025-06-06 DIAGNOSIS — F41.1 GENERALIZED ANXIETY DISORDER: Chronic | ICD-10-CM

## 2025-06-09 RX ORDER — ALPRAZOLAM 0.5 MG
0.5 TABLET ORAL 3 TIMES DAILY PRN
Qty: 30 TABLET | Refills: 5 | Status: SHIPPED | OUTPATIENT
Start: 2025-06-09

## 2025-06-27 ENCOUNTER — RA CDI HCC (OUTPATIENT)
Dept: OTHER | Facility: HOSPITAL | Age: 45
End: 2025-06-27

## 2025-06-30 ENCOUNTER — OFFICE VISIT (OUTPATIENT)
Dept: FAMILY MEDICINE CLINIC | Facility: CLINIC | Age: 45
End: 2025-06-30
Payer: COMMERCIAL

## 2025-06-30 VITALS
WEIGHT: 184.3 LBS | HEART RATE: 68 BPM | DIASTOLIC BLOOD PRESSURE: 69 MMHG | OXYGEN SATURATION: 97 % | BODY MASS INDEX: 26.44 KG/M2 | SYSTOLIC BLOOD PRESSURE: 137 MMHG | TEMPERATURE: 97.7 F

## 2025-06-30 DIAGNOSIS — F41.1 GENERALIZED ANXIETY DISORDER: ICD-10-CM

## 2025-06-30 DIAGNOSIS — Q04.8 CEREBELLAR TONSILLAR ECTOPIA (HCC): Primary | ICD-10-CM

## 2025-06-30 PROCEDURE — 99214 OFFICE O/P EST MOD 30 MIN: CPT | Performed by: FAMILY MEDICINE

## 2025-06-30 NOTE — ASSESSMENT & PLAN NOTE
My gut feeling is that this is still all anxiety related.  However, since he is still symptomatic I think we need to work him up further.  I am going to recommend a repeat CT of the head and also going to recommend a referral to neurology.  I placed a referral to see neurology.  I am going to schedule a follow-up visit with the patient in a month.  Orders:    CT head wo contrast; Future    Ambulatory Referral to Neurology; Future

## 2025-06-30 NOTE — ASSESSMENT & PLAN NOTE
Patient has severe anxiety.  I really think this is the etiology of his symptoms.  In the past, he had been on 100 mg of sertraline.  He is concerned about other psychiatric disorders such as ADHD.  As such, going to recommend referral to psychiatry.  I placed a referral to see Dr. Blair.  I advised the patient to continue sertraline 50 mg daily and continue alprazolam as needed for now.  Orders:    Ambulatory referral to Psych Services; Future

## 2025-06-30 NOTE — PROGRESS NOTES
Name: Everardo Marcus      : 1980      MRN: [id number]  Encounter Provider: Kirby Chang DO  Encounter Date: 2025   Encounter department: [facility] PRIMARY CARE  :  Assessment & Plan  Cerebellar tonsillar ectopia (HCC)  My gut feeling is that this is still all anxiety related.  However, since he is still symptomatic I think we need to work him up further.  I am going to recommend a repeat CT of the head and also going to recommend a referral to neurology.  I placed a referral to see neurology.  I am going to schedule a follow-up visit with the patient in a month.  Orders:    CT head wo contrast; Future    Ambulatory Referral to Neurology; Future    Generalized anxiety disorder  Patient has severe anxiety.  I really think this is the etiology of his symptoms.  In the past, he had been on 100 mg of sertraline.  He is concerned about other psychiatric disorders such as ADHD.  As such, going to recommend referral to psychiatry.  I placed a referral to see Dr. Blair.  I advised the patient to continue sertraline 50 mg daily and continue alprazolam as needed for now.  Orders:    Ambulatory referral to Psych Services; Future           History of Present Illness   This is a 45-year-old white male who presents to the office today for his follow-up visit.  Patient tells me he is really not feeling any better on the sertraline.  He tells me he does not feel relaxed.  He tells me he feels anxious.  He tells me the Xanax helps for short period of time when he takes it but he does not take it very often.  He tells me he has racing thoughts.  He denies any negative thoughts.  He tells me he was at a wedding this past weekend.  He tells me he was a mess.  He tells me although he was good friends with the people were getting , he was very anxious around them.  He tells me he still gets tremors.  His wife notes that this seems to occur when he gets anxious.      Review of Systems   Cardiovascular:   Negative for chest pain, palpitations and leg swelling.   Gastrointestinal:  Negative for abdominal distention, abdominal pain, blood in stool, constipation, diarrhea and nausea.   Neurological:  Positive for tremors.   Psychiatric/Behavioral:  Negative for dysphoric mood and suicidal ideas. The patient is nervous/anxious.        Objective   /69   Pulse 68   Temp 97.7 °F (36.5 °C)   Wt 83.6 kg (184 lb 4.8 oz)   SpO2 97%   BMI 26.44 kg/m²      Physical Exam  Vitals reviewed.   Constitutional:       Comments: This is a 45-year-old white male who appears his stated age.  The patient is well-nourished.  He was neat in appearance.  He appeared very calm and collected throughout the history portion of the exam.  However, when I got close to I went to examine him, he immediately started trembling.  His head was turning involuntarily to the right again.  His face also became flushed.   HENT:      Head: Normocephalic and atraumatic.      Right Ear: Tympanic membrane, ear canal and external ear normal. There is no impacted cerumen.      Left Ear: Tympanic membrane, ear canal and external ear normal. There is no impacted cerumen.      Mouth/Throat:      Mouth: Mucous membranes are moist.      Pharynx: Oropharynx is clear. No oropharyngeal exudate or posterior oropharyngeal erythema.     Eyes:      General: No scleral icterus.        Right eye: No discharge.         Left eye: No discharge.      Conjunctiva/sclera: Conjunctivae normal.      Pupils: Pupils are equal, round, and reactive to light.       Cardiovascular:      Rate and Rhythm: Normal rate and regular rhythm.      Heart sounds: Normal heart sounds. No murmur heard.     No friction rub. No gallop.   Pulmonary:      Effort: Pulmonary effort is normal. No respiratory distress.      Breath sounds: Normal breath sounds. No stridor. No wheezing, rhonchi or rales.   Abdominal:      General: There is no distension.      Palpations: Abdomen is soft. There is no  mass.      Tenderness: There is no abdominal tenderness. There is no guarding.     Musculoskeletal:      Cervical back: Neck supple.   Lymphadenopathy:      Cervical: No cervical adenopathy.     Psychiatric:      Comments: As noted in the exam under constitutional appearance, the patient was calm until I got close to him to examine him.  He then immediately became very anxious and trembles.  He became flushed and has had Turning involuntarily to the right

## 2025-07-02 ENCOUNTER — TELEPHONE (OUTPATIENT)
Age: 45
End: 2025-07-02

## 2025-07-02 NOTE — TELEPHONE ENCOUNTER
NATASHAOM asking patient to please call office back if they would be interested in scheduling a new patient appointment with Dr. Blair in the [place] office ([address] [facility]). She is there on Tuesdays.    Patient can call [phone]    Call center: please transfer to [place].